# Patient Record
Sex: MALE | Race: WHITE | NOT HISPANIC OR LATINO | Employment: OTHER | URBAN - METROPOLITAN AREA
[De-identification: names, ages, dates, MRNs, and addresses within clinical notes are randomized per-mention and may not be internally consistent; named-entity substitution may affect disease eponyms.]

---

## 2022-04-23 ENCOUNTER — HOSPITAL ENCOUNTER (EMERGENCY)
Facility: HOSPITAL | Age: 77
Discharge: HOME | End: 2022-04-23
Attending: EMERGENCY MEDICINE
Payer: COMMERCIAL

## 2022-04-23 VITALS
OXYGEN SATURATION: 98 % | WEIGHT: 220 LBS | TEMPERATURE: 97.3 F | SYSTOLIC BLOOD PRESSURE: 135 MMHG | HEIGHT: 72 IN | RESPIRATION RATE: 17 BRPM | BODY MASS INDEX: 29.8 KG/M2 | DIASTOLIC BLOOD PRESSURE: 70 MMHG | HEART RATE: 78 BPM

## 2022-04-23 DIAGNOSIS — K40.90 INGUINAL HERNIA: Primary | ICD-10-CM

## 2022-04-23 PROCEDURE — 99283 EMERGENCY DEPT VISIT LOW MDM: CPT

## 2022-04-23 PROCEDURE — 99282 EMERGENCY DEPT VISIT SF MDM: CPT

## 2022-04-23 RX ORDER — ATORVASTATIN CALCIUM 10 MG/1
10 TABLET, FILM COATED ORAL EVERY EVENING
Status: ON HOLD | COMMUNITY

## 2022-04-23 NOTE — ED PROVIDER NOTES
History  Chief Complaint   Patient presents with    Hernia     Pt presents with brother who is concerned pt has bilateral hernias, denies GI symptoms, denies pain  Patient is a 78 YOM who presents to the ED for evaluation of bilateral inguinal hernia  Patient reports that he has had this for over a year, the hernia has recently doubled in size  Patient reports that the hernia causes no pain or discomfort and has not had any skin discoloration  Patient denies any NV, stool changes, urinary issues, testicular pain  He has no other complaints at this time  Prior to Admission Medications   Prescriptions Last Dose Informant Patient Reported? Taking?   atorvastatin (LIPITOR) 10 mg tablet More than a month at Unknown time  Yes No   Sig: Take 10 mg by mouth daily      Facility-Administered Medications: None       History reviewed  No pertinent past medical history  History reviewed  No pertinent surgical history  History reviewed  No pertinent family history  I have reviewed and agree with the history as documented  E-Cigarette/Vaping    E-Cigarette Use Never User      E-Cigarette/Vaping Substances    Nicotine No     THC No     CBD No     Flavoring No     Other No     Unknown No      Social History     Tobacco Use    Smoking status: Former Smoker    Smokeless tobacco: Never Used   Vaping Use    Vaping Use: Never used   Substance Use Topics    Alcohol use: Yes     Comment: rare    Drug use: Not Currently       Review of Systems   Constitutional: Negative for activity change, appetite change, chills and fever  Respiratory: Negative for chest tightness and shortness of breath  Gastrointestinal: Negative for abdominal distention, abdominal pain, blood in stool, diarrhea, nausea and vomiting  Genitourinary: Negative for difficulty urinating, hematuria, scrotal swelling and testicular pain  Musculoskeletal: Negative for arthralgias and back pain     Skin: Negative for color change, pallor and rash  Neurological: Negative for dizziness, facial asymmetry, light-headedness and headaches  All other systems reviewed and are negative  Physical Exam  Physical Exam  Vitals and nursing note reviewed  Exam conducted with a chaperone present Hector Lui)  Constitutional:       Appearance: Normal appearance  HENT:      Head: Normocephalic and atraumatic  Nose: Nose normal    Cardiovascular:      Rate and Rhythm: Normal rate and regular rhythm  Pulses: Normal pulses  Heart sounds: Normal heart sounds  No murmur heard  Pulmonary:      Effort: Pulmonary effort is normal  No respiratory distress  Breath sounds: Normal breath sounds  No stridor  No wheezing or rales  Genitourinary:     Penis: Normal  No tenderness  Testes: Normal          Right: Tenderness or swelling not present  Left: Tenderness or swelling not present  Epididymis:      Right: Normal       Left: Normal        Musculoskeletal:         General: Normal range of motion  Cervical back: Normal range of motion and neck supple  Skin:     General: Skin is warm and dry  Neurological:      Mental Status: He is alert and oriented to person, place, and time     Psychiatric:         Mood and Affect: Mood normal          Behavior: Behavior normal          Vital Signs  ED Triage Vitals [04/23/22 1431]   Temperature Pulse Respirations Blood Pressure SpO2   (!) 97 3 °F (36 3 °C) 81 16 152/63 98 %      Temp Source Heart Rate Source Patient Position - Orthostatic VS BP Location FiO2 (%)   Temporal Monitor Sitting Right arm --      Pain Score       No Pain           Vitals:    04/23/22 1431   BP: 152/63   Pulse: 81   Patient Position - Orthostatic VS: Sitting         Visual Acuity      ED Medications  Medications - No data to display    Diagnostic Studies  Results Reviewed     None                 No orders to display              Procedures  Procedures         ED Course SBIRT 20yo+      Most Recent Value   SBIRT (24 yo +)    In order to provide better care to our patients, we are screening all of our patients for alcohol and drug use  Would it be okay to ask you these screening questions? Yes Filed at: 04/23/2022 1441   Initial Alcohol Screen: US AUDIT-C     1  How often do you have a drink containing alcohol? 1 Filed at: 04/23/2022 1441   2  How many drinks containing alcohol do you have on a typical day you are drinking? 0 Filed at: 04/23/2022 1441   3a  Male UNDER 65: How often do you have five or more drinks on one occasion? 0 Filed at: 04/23/2022 1441   3b  FEMALE Any Age, or MALE 65+: How often do you have 4 or more drinks on one occassion? 0 Filed at: 04/23/2022 1441   Audit-C Score 1 Filed at: 04/23/2022 1441   WILFREDO: How many times in the past year have you    Used an illegal drug or used a prescription medication for non-medical reasons? Never Filed at: 04/23/2022 1441                    MDM  Number of Diagnoses or Management Options  Inguinal hernia: new and requires workup  Diagnosis management comments: Inguinal hernia that does not cause pain or discomfort  Reducible in ED  No NV, appetite changes, issues with bowel movements or urination  No testicular pain or swelling  Referral to general surgery for elective consultation  Extensive discussion about reasons to return for emergent intervention including but not limited to pain, discoloration, NV  Patient and brother in agreement with plan           Amount and/or Complexity of Data Reviewed  Discuss the patient with other providers: yes    Patient Progress  Patient progress: stable      Disposition  Final diagnoses:   Inguinal hernia     Time reflects when diagnosis was documented in both MDM as applicable and the Disposition within this note     Time User Action Codes Description Comment    4/23/2022  3:29 PM Vinetta Schaumann Add [K40 90] Inguinal hernia       ED Disposition     ED Disposition Condition Date/Time Comment    Discharge Stable Sat Apr 23, 2022  3:28 PM Theodore Ponce discharge to home/self care  Follow-up Information     Follow up With Specialties Details Why Contact Info Additional Information    [facility] General Surgery [facility] Surgery Schedule an appointment as soon as possible for a visit   [address] [place] [phone]  [place], [address] [facility], [address] [place] [address], [address] [facility]          Patient's Medications   Discharge Prescriptions    No medications on file       No discharge procedures on file      PDMP Review     None          ED Provider  Electronically Signed by           Celestino Bay PA-C  04/23/22 6872

## 2022-04-23 NOTE — DISCHARGE INSTRUCTIONS
Follow-up with general surgery for consultation  Return to ED if pain discoloration, nausea, vomiting develop

## 2022-05-09 ENCOUNTER — APPOINTMENT (OUTPATIENT)
Dept: LAB | Facility: HOSPITAL | Age: 77
End: 2022-05-09
Attending: SURGERY
Payer: COMMERCIAL

## 2022-05-09 ENCOUNTER — HOSPITAL ENCOUNTER (OUTPATIENT)
Dept: RADIOLOGY | Facility: HOSPITAL | Age: 77
Discharge: HOME | End: 2022-05-09
Attending: SURGERY
Payer: COMMERCIAL

## 2022-05-09 ENCOUNTER — CONSULT (OUTPATIENT)
Dept: SURGERY | Facility: HOSPITAL | Age: 77
End: 2022-05-09
Payer: COMMERCIAL

## 2022-05-09 VITALS
RESPIRATION RATE: 16 BRPM | BODY MASS INDEX: 26.28 KG/M2 | HEIGHT: 72 IN | HEART RATE: 68 BPM | DIASTOLIC BLOOD PRESSURE: 87 MMHG | SYSTOLIC BLOOD PRESSURE: 132 MMHG | WEIGHT: 194 LBS | TEMPERATURE: 97.8 F

## 2022-05-09 DIAGNOSIS — K40.90 NON-RECURRENT UNILATERAL INGUINAL HERNIA WITHOUT OBSTRUCTION OR GANGRENE: ICD-10-CM

## 2022-05-09 DIAGNOSIS — Z12.12 ENCOUNTER FOR COLORECTAL CANCER SCREENING: ICD-10-CM

## 2022-05-09 DIAGNOSIS — K40.90 NON-RECURRENT UNILATERAL INGUINAL HERNIA WITHOUT OBSTRUCTION OR GANGRENE: Primary | ICD-10-CM

## 2022-05-09 DIAGNOSIS — Z12.11 ENCOUNTER FOR COLORECTAL CANCER SCREENING: ICD-10-CM

## 2022-05-09 LAB
EST. AVERAGE GLUCOSE BLD GHB EST-MCNC: 114 MG/DL
HBA1C MFR BLD: 5.6 %

## 2022-05-09 PROCEDURE — 93005 ELECTROCARDIOGRAM TRACING: CPT

## 2022-05-09 PROCEDURE — 71046 X-RAY EXAM CHEST 2 VIEWS: CPT

## 2022-05-09 PROCEDURE — 83036 HEMOGLOBIN GLYCOSYLATED A1C: CPT

## 2022-05-09 PROCEDURE — 99203 OFFICE O/P NEW LOW 30 MIN: CPT | Performed by: SURGERY

## 2022-05-09 PROCEDURE — 36415 COLL VENOUS BLD VENIPUNCTURE: CPT

## 2022-05-10 LAB
ATRIAL RATE: 68 BPM
P AXIS: 8 DEGREES
PR INTERVAL: 144 MS
QRS AXIS: 29 DEGREES
QRSD INTERVAL: 78 MS
QT INTERVAL: 390 MS
QTC INTERVAL: 414 MS
T WAVE AXIS: 21 DEGREES
VENTRICULAR RATE: 68 BPM

## 2022-05-10 PROCEDURE — 93010 ELECTROCARDIOGRAM REPORT: CPT | Performed by: INTERNAL MEDICINE

## 2022-05-13 RX ORDER — SODIUM CHLORIDE, SODIUM LACTATE, POTASSIUM CHLORIDE, CALCIUM CHLORIDE 600; 310; 30; 20 MG/100ML; MG/100ML; MG/100ML; MG/100ML
125 INJECTION, SOLUTION INTRAVENOUS CONTINUOUS
Status: CANCELLED | OUTPATIENT
Start: 2022-05-25

## 2022-05-13 RX ORDER — CEFAZOLIN SODIUM 2 G/50ML
2000 SOLUTION INTRAVENOUS ONCE
Status: CANCELLED | OUTPATIENT
Start: 2022-05-25 | End: 2022-05-24

## 2022-05-13 NOTE — PROGRESS NOTES
Assessment/Plan:   Jana Lawrence is a 78 y o male who is here for Inguinal Hernia (New patient referred by his PCP for evaluation of BIH that patient has had for approximately over one year  Only has discomfort when hernias "swell up"  No n/v/f/c  No change in bladder or bowels  No previous hernia surgery )    Plan: right inguinal hernia - discussed operative vs conservative mgt, surgical approaches, risks and benefits and patient has decided to proceed with open right inguinal hernia repair  I will schedule at their earliest convenience  Preoperative Clearance: None    HPI:  Jana Lawrence is a 78 y o male who was referred for evaluation of Inguinal Hernia (New patient referred by his PCP for evaluation of BIH that patient has had for approximately over one year  Only has discomfort when hernias "swell up"  No n/v/f/c  No change in bladder or bowels  No previous hernia surgery )    Currently having right groin pain  ROS:  General ROS: negative  negative for - chills, fatigue, fever or night sweats, weight loss  Respiratory ROS: no cough, shortness of breath, or wheezing  Cardiovascular ROS: no chest pain or dyspnea on exertion  Genito-Urinary ROS: no dysuria, trouble voiding, or hematuria  Musculoskeletal ROS: negative for - gait disturbance, joint pain or muscle pain  Neurological ROS: no TIA or stroke symptoms  Right groin pain    [unfilled]  Patient has no known allergies  Current Outpatient Medications:     atorvastatin (LIPITOR) 10 mg tablet, Take 10 mg by mouth daily, Disp: , Rfl:   Past Medical History:   Diagnosis Date    Bilateral inguinal hernia 04/23/2022    Evaluated @ [facility]    Hyperlipidemia      Past Surgical History:   Procedure Laterality Date    CATARACT EXTRACTION      COLONOSCOPY W/ BIOPSIES  11/12/2001    Danni Vila MD     History reviewed  No pertinent family history  reports that he has quit smoking   He has never used smokeless tobacco  He reports current alcohol use  He reports previous drug use  Labs:   No results found for: WBC, HGB, PLT  No results found for: ALT, AST  This SmartLink has not been configured with any valid records  PHYSICAL EXAM  General Appearance:    Alert, cooperative, no distress,    Head:    Normocephalic without obvious abnormality   Eyes:    PERRL, conjunctiva/corneas clear, EOM's intact        Neck:   Supple, no adenopathy, no JVD   Back:     Symmetric, no spinal or CVA tenderness   Lungs:     Clear to auscultation bilaterally, no wheezing or rhonchi   Heart:    Regular rate and rhythm, S1 and S2 normal, no murmur   Abdomen:     Soft +BS ND TTP in right groin, RIH   Extremities:   Extremities normal  No clubbing, cyanosis or edema   Psych:   Normal Affect, AOx3  Neurologic:  Skin:   CNII-XII intact  Strength symmetric, speech intact    Warm, dry, intact, no visible rashes or lesions         Physical Exam              Some portions of this record may have been generated with voice recognition software  There may be translation, syntax,  or grammatical errors  Occasional wrong word or "sound-a-like" substitutions may have occurred due to the inherent limitations of the voice recognition software  Read the chart carefully and recognize, using context, where substitutions may have occurred  If you have any questions, please contact the dictating provider for clarification or correction, as needed  This encounter has been coded by a non-certified coder

## 2022-05-14 ENCOUNTER — APPOINTMENT (EMERGENCY)
Dept: CT IMAGING | Facility: HOSPITAL | Age: 77
End: 2022-05-14
Payer: COMMERCIAL

## 2022-05-14 ENCOUNTER — HOSPITAL ENCOUNTER (EMERGENCY)
Facility: HOSPITAL | Age: 77
Discharge: HOME | End: 2022-05-14
Attending: EMERGENCY MEDICINE
Payer: COMMERCIAL

## 2022-05-14 VITALS
OXYGEN SATURATION: 99 % | TEMPERATURE: 97.8 F | SYSTOLIC BLOOD PRESSURE: 134 MMHG | DIASTOLIC BLOOD PRESSURE: 83 MMHG | RESPIRATION RATE: 16 BRPM | HEART RATE: 59 BPM

## 2022-05-14 DIAGNOSIS — K59.00 CONSTIPATION, UNSPECIFIED CONSTIPATION TYPE: Primary | ICD-10-CM

## 2022-05-14 PROCEDURE — 74176 CT ABD & PELVIS W/O CONTRAST: CPT

## 2022-05-14 PROCEDURE — 99284 EMERGENCY DEPT VISIT MOD MDM: CPT

## 2022-05-14 PROCEDURE — 99284 EMERGENCY DEPT VISIT MOD MDM: CPT | Performed by: EMERGENCY MEDICINE

## 2022-05-14 PROCEDURE — G1004 CDSM NDSC: HCPCS

## 2022-05-14 RX ADMIN — IOHEXOL 50 ML: 240 INJECTION, SOLUTION INTRATHECAL; INTRAVASCULAR; INTRAVENOUS; ORAL at 09:45

## 2022-05-14 NOTE — ED PROCEDURE NOTE
PROCEDURE  Procedures   No procedure performed  Disregard this procedure note       Jose Parsons DO  05/16/22 0684

## 2022-05-14 NOTE — DISCHARGE INSTRUCTIONS
Take MiraLax twice a day until you have several days of bowel movements  Then take it once a day for another week  Follow-up with the surgeon if any difficulties arise

## 2022-05-14 NOTE — ED PROVIDER NOTES
History  Chief Complaint   Patient presents with    Abdominal Pain     Abdominal stiffness and no bowel movement for 9 days  26-year-old male with history hyperlipidemia and bilateral inguinal hernia presents with his brother with concern for no bowel movement over last 9 days  He says he usually has a bowel movement every day or 2  Patient denies change in appetite, abdominal pain, vomiting and dysuria  He does note slight increase in size of the inguinal hernias since April 23  The was evaluated by the surgeon and is scheduled for herniorrhaphy in 11 days  Patient has no other complaints  His brother, who states he has a doctor, endorses these complaints  He is concerned for a partial bowel obstruction  Prior to Admission Medications   Prescriptions Last Dose Informant Patient Reported? Taking?   atorvastatin (LIPITOR) 10 mg tablet   Yes No   Sig: Take 10 mg by mouth daily      Facility-Administered Medications: None       Past Medical History:   Diagnosis Date    Bilateral inguinal hernia 04/23/2022    Evaluated @ [facility]    Hyperlipidemia        Past Surgical History:   Procedure Laterality Date    CATARACT EXTRACTION      COLONOSCOPY W/ BIOPSIES  11/12/2001    Ritu Gao MD       History reviewed  No pertinent family history  I have reviewed and agree with the history as documented  E-Cigarette/Vaping    E-Cigarette Use Never User      E-Cigarette/Vaping Substances    Nicotine No     THC No     CBD No     Flavoring No     Other No     Unknown No      Social History     Tobacco Use    Smoking status: Former Smoker    Smokeless tobacco: Never Used   Vaping Use    Vaping Use: Never used   Substance Use Topics    Alcohol use: Yes     Comment: rare    Drug use: Not Currently       Review of Systems   Gastrointestinal: Positive for constipation  Negative for abdominal pain, blood in stool, diarrhea, nausea and vomiting  Genitourinary: Positive for scrotal swelling  Negative for decreased urine volume, dysuria, flank pain, frequency and hematuria  All other systems reviewed and are negative  Physical Exam  Physical Exam  Vitals and nursing note reviewed  Constitutional:       General: He is not in acute distress  Appearance: He is well-developed and normal weight  He is not ill-appearing or diaphoretic  HENT:      Head: Normocephalic and atraumatic  Right Ear: External ear normal       Left Ear: External ear normal       Mouth/Throat:      Mouth: Mucous membranes are moist       Pharynx: Oropharynx is clear  Eyes:      General: No scleral icterus  Conjunctiva/sclera: Conjunctivae normal       Pupils: Pupils are equal, round, and reactive to light  Neck:      Vascular: No JVD  Cardiovascular:      Rate and Rhythm: Normal rate and regular rhythm  Heart sounds: Normal heart sounds  No murmur heard  Pulmonary:      Effort: Pulmonary effort is normal       Breath sounds: Normal breath sounds  Abdominal:      General: Abdomen is flat  Bowel sounds are normal  There is no distension or abdominal bruit  There are no signs of injury  Palpations: Abdomen is soft  There is no fluid wave or mass  Tenderness: There is no abdominal tenderness  There is no guarding or rebound  Hernia: A hernia is present  Hernia is present in the left inguinal area and right inguinal area  Musculoskeletal:         General: No tenderness  Normal range of motion  Cervical back: Normal range of motion and neck supple  Lymphadenopathy:      Cervical: No cervical adenopathy  Skin:     General: Skin is warm and dry  Capillary Refill: Capillary refill takes less than 2 seconds  Findings: No rash  Neurological:      General: No focal deficit present  Mental Status: He is alert and oriented to person, place, and time  Cranial Nerves: No cranial nerve deficit        Coordination: Coordination normal       Deep Tendon Reflexes: Reflexes are normal and symmetric  Psychiatric:         Mood and Affect: Mood normal          Behavior: Behavior normal          Vital Signs  ED Triage Vitals [05/14/22 0904]   Temperature Pulse Respirations Blood Pressure SpO2   97 8 °F (36 6 °C) 71 16 167/79 99 %      Temp Source Heart Rate Source Patient Position - Orthostatic VS BP Location FiO2 (%)   Temporal Monitor Lying Right arm --      Pain Score       No Pain           Vitals:    05/14/22 1000 05/14/22 1030 05/14/22 1100 05/14/22 1130   BP: 130/86 117/79 141/67 134/83   Pulse: 57 55 55 59   Patient Position - Orthostatic VS: Lying Lying Lying Lying         Visual Acuity      ED Medications  Medications   iohexol (OMNIPAQUE) 240 MG/ML solution 50 mL (50 mL Oral Given 5/14/22 0945)       Diagnostic Studies  Results Reviewed     None                 CT abdomen pelvis wo contrast   Final Result by Debbie Mayorga MD (05/14 1218)      No acute inflammatory changes in the abdomen or pelvis  Large bilateral bowel-containing hernias not currently causing obstruction  Prominent stool throughout the colon, both proximal and distal to the sigmoid colon containing left inguinal hernia, suggestive of constipation  Workstation performed: [id number]                    Procedures  Procedures         ED Course                                             MDM  Number of Diagnoses or Management Options  Constipation, unspecified constipation type: new and requires workup  Diagnosis management comments: Elderly male with large bilateral inguinal hernias has not had bowel movement for 9 days  Denies vomiting, fever and pain  She has you poor historian  Here with his brother helps to give history  Patient had recent lab work  He had remission testing for in herniorrhaphy  Will check CT of abdomen pelvis with oral contrast to rule out obstruction, mass, inflammation, incarceration, etcetera  CT shows no obstruction    There are bilateral inguinal hernias without incarceration  Patient stable for discharge  Constipation instructions given  Amount and/or Complexity of Data Reviewed  Tests in the radiology section of CPT®: ordered and reviewed  Review and summarize past medical records: yes        Disposition  Final diagnoses:   Constipation, unspecified constipation type     Time reflects when diagnosis was documented in both MDM as applicable and the Disposition within this note     Time User Action Codes Description Comment    5/14/2022 12:26 PM Jeana Brownlee Add [K59 00] Constipation, unspecified constipation type       ED Disposition     ED Disposition   Discharge    Condition   Stable    Date/Time   Sat May 14, 2022 12:26 PM    Comment   Gareth Aburto discharge to home/self care  Follow-up Information     Follow up With Specialties Details Why Contact Info    Sd Macias MD General Surgery Call  As needed [address] [facility]  [phone]            Patient's Medications   Discharge Prescriptions    No medications on file       No discharge procedures on file      PDMP Review     None          ED Provider  Electronically Signed by           Sandip Cortez DO  05/14/22 0668

## 2022-05-23 DIAGNOSIS — Z12.11 SCREENING FOR COLON CANCER: Primary | ICD-10-CM

## 2022-06-01 LAB — COLOGUARD RESULT REPORTABLE: POSITIVE

## 2022-06-02 NOTE — H&P (VIEW-ONLY)
Colon and Rectal Surgery   Virgen Calderon 78 y o  male MRN [id number]  Encounter: [id number]  06/06/22 3:54 PM            Assessment: Virgen Calderon is a 78 y o  male who has a positive Cologuard  Plan:   Positive colorectal cancer screening using Cologuard test  Colonoscopy risks, not limited to bleeding, perforated colon, need for surgery, and missed lesions were discussed  Alternatives were discussed  Questions were answered  He agreed to the procedure  He has large inguinal hernias  We will try to reduce them during his colonoscopy preparation  Hopefully, we can accomplish colonoscopy without any significant untoward events  He should then proceed to hernia repair after the colonoscopy  I asked him to reschedule to follow the colonoscopy as soon as possible  If we find a colon lesion, we could change that plan  Subjective     HPI    Virgen Calderon is a 78 y o  male who is here today for evaluation of positive cologuard  He states that he has bowel movements every 1-2 days  He was taking miralax daily but has cut back recently  When he is not take the miralax he has hard stools and has to strain to have bowel movements  He denies any rectal bleeding or blood in his stool  He has two inguinal hernias that cause him pain and discomfort  He has seen a general surgeon and was schedule to have hernia surgery however he recently had a positive cologuard  His surgery was canceled until he has a colonoscopy  I have schedule him for colonoscopy on 6/15/2022 with Dr Donya Peguero  He had a negative cologuard in 2019  Recently he had a positive cologuard on 5/25/2022  His most recent colonoscopy was 1/24/2011 with Dr Marylen Dresser, which showed mild diverticulosis sigmoid colon  Pathology reported colonic mucosa with focal early adenomatous changes, hyperplastic polyp  Recall colonoscopy 3 years            Historical Information   Past Medical History:   Diagnosis Date    Bilateral inguinal hernia 04/23/2022    Evaluated @ [facility]    Hyperlipidemia      Past Surgical History:   Procedure Laterality Date    CATARACT EXTRACTION      COLONOSCOPY      COLONOSCOPY W/ BIOPSIES  11/12/2001    Rober Chan MD       Meds/Allergies       Current Outpatient Medications:     atorvastatin (LIPITOR) 10 mg tablet, Take 10 mg by mouth every evening, Disp: , Rfl:   No Known Allergies    Social History   Social History     Substance and Sexual Activity   Drug Use Not Currently     Social History     Tobacco Use   Smoking Status Former Smoker   Smokeless Tobacco Never Used         History reviewed  No pertinent family history  Review of Systems    Objective   Current Vitals:  Vitals:    06/06/22 1511   Weight: 88 5 kg (195 lb)   Height: 6' (1 829 m)         Physical Exam  Constitutional:       Appearance: Normal appearance  Cardiovascular:      Rate and Rhythm: Normal rate and regular rhythm  Pulmonary:      Effort: Pulmonary effort is normal       Breath sounds: Normal breath sounds  Abdominal:      General: Abdomen is flat  Palpations: Abdomen is soft  There is no mass  Tenderness: There is no abdominal tenderness  There is no guarding  Hernia: A hernia is present  Comments: Large inguinal hernias reduced, completely or nearly completely  Neurological:      General: No focal deficit present  Mental Status: He is alert and oriented to person, place, and time     Psychiatric:         Mood and Affect: Mood normal          Behavior: Behavior normal

## 2022-06-06 PROBLEM — R19.5 POSITIVE COLORECTAL CANCER SCREENING USING COLOGUARD TEST: Status: ACTIVE | Noted: 2022-06-06

## 2022-06-15 ENCOUNTER — ANESTHESIA (OUTPATIENT)
Dept: GASTROENTEROLOGY | Facility: HOSPITAL | Age: 77
End: 2022-06-15

## 2022-06-15 ENCOUNTER — ANESTHESIA EVENT (OUTPATIENT)
Dept: GASTROENTEROLOGY | Facility: HOSPITAL | Age: 77
End: 2022-06-15

## 2022-06-15 ENCOUNTER — HOSPITAL ENCOUNTER (OUTPATIENT)
Dept: GASTROENTEROLOGY | Facility: HOSPITAL | Age: 77
Setting detail: OUTPATIENT SURGERY
Discharge: HOME | End: 2022-06-15
Attending: COLON & RECTAL SURGERY
Payer: COMMERCIAL

## 2022-06-15 VITALS
SYSTOLIC BLOOD PRESSURE: 127 MMHG | TEMPERATURE: 97.7 F | HEART RATE: 76 BPM | OXYGEN SATURATION: 97 % | RESPIRATION RATE: 18 BRPM | DIASTOLIC BLOOD PRESSURE: 79 MMHG

## 2022-06-15 DIAGNOSIS — Z86.010 PERSONAL HISTORY OF COLONIC POLYPS: ICD-10-CM

## 2022-06-15 DIAGNOSIS — R19.5 POSITIVE COLORECTAL CANCER SCREENING USING COLOGUARD TEST: ICD-10-CM

## 2022-06-15 PROBLEM — E78.5 HYPERLIPIDEMIA: Chronic | Status: ACTIVE | Noted: 2022-06-15

## 2022-06-15 PROCEDURE — 88305 TISSUE EXAM BY PATHOLOGIST: CPT | Performed by: PATHOLOGY

## 2022-06-15 PROCEDURE — 45385 COLONOSCOPY W/LESION REMOVAL: CPT | Performed by: COLON & RECTAL SURGERY

## 2022-06-15 RX ORDER — PROPOFOL 10 MG/ML
INJECTION, EMULSION INTRAVENOUS AS NEEDED
Status: DISCONTINUED | OUTPATIENT
Start: 2022-06-15 | End: 2022-06-15

## 2022-06-15 RX ORDER — SODIUM CHLORIDE 9 MG/ML
INJECTION, SOLUTION INTRAVENOUS CONTINUOUS PRN
Status: DISCONTINUED | OUTPATIENT
Start: 2022-06-15 | End: 2022-06-15

## 2022-06-15 RX ADMIN — PROPOFOL 30 MG: 10 INJECTION, EMULSION INTRAVENOUS at 12:39

## 2022-06-15 RX ADMIN — SODIUM CHLORIDE: 9 INJECTION, SOLUTION INTRAVENOUS at 12:24

## 2022-06-15 RX ADMIN — PROPOFOL 30 MG: 10 INJECTION, EMULSION INTRAVENOUS at 12:34

## 2022-06-15 RX ADMIN — PROPOFOL 100 MG: 10 INJECTION, EMULSION INTRAVENOUS at 12:30

## 2022-06-15 RX ADMIN — PROPOFOL 50 MG: 10 INJECTION, EMULSION INTRAVENOUS at 12:45

## 2022-06-15 RX ADMIN — PROPOFOL 50 MG: 10 INJECTION, EMULSION INTRAVENOUS at 12:51

## 2022-06-15 NOTE — ANESTHESIA POSTPROCEDURE EVALUATION
Post-Op Assessment Note    CV Status:  Stable  Pain Score: 0    Pain management: adequate     Mental Status:  Alert and awake   Hydration Status:  Stable   PONV Controlled:  None   Airway Patency:  Patent      Post Op Vitals Reviewed: Yes      Staff: CRNA         No complications documented      /69 (06/15/22 1259)    Temp 97 7 °F (36 5 °C) (06/15/22 1259)    Pulse 73 (06/15/22 1259)   Resp 16 (06/15/22 1259)    SpO2 96 % (06/15/22 1259)

## 2022-06-15 NOTE — ANESTHESIA PREPROCEDURE EVALUATION
Procedure:  COLONOSCOPY    Relevant Problems   ANESTHESIA (within normal limits)      CARDIO   (+) Hyperlipidemia      ENDO (within normal limits)      GI/HEPATIC (within normal limits)      /RENAL (within normal limits)      GYN (within normal limits)      HEMATOLOGY (within normal limits)      MUSCULOSKELETAL (within normal limits)      NEURO/PSYCH (within normal limits)  Possible autism spectrum, has personal nurse advocate with him at bedside      PULMONARY (within normal limits)      Recent labs personally reviewed:  No results found for: WBC, HGB, PLT  No results found for: NA, K, BUN, CREATININE, GLUCOSE  No results found for: PTT   No results found for: INR      Lab Results   Component Value Date    HGBA1C 5 6 05/09/2022       Physical Exam    Airway      TM Distance: >3 FB  Neck ROM: full     Dental       Cardiovascular  Cardiovascular exam normal    Pulmonary  Pulmonary exam normal     Other Findings  Poison Ivy      Anesthesia Plan  ASA Score- 2     Anesthesia Type- IV sedation with anesthesia with ASA Monitors  Additional Monitors:   Airway Plan:     Comment: Supplemental o2, etco2 monitoring  Plan Factors-Exercise tolerance (METS): >4 METS  Chart reviewed  Patient summary reviewed  Patient is not a current smoker  Patient not instructed to abstain from smoking on day of procedure  Patient did not smoke on day of surgery  Induction- intravenous  Postoperative Plan-     Informed Consent- Anesthetic plan and risks discussed with patient  I personally reviewed this patient with the CRNA  Discussed and agreed on the Anesthesia Plan with the CRNA  José Manuel Wren

## 2022-06-15 NOTE — INTERVAL H&P NOTE
H&P reviewed  After examining the patient I find no changes in the patients condition since the H&P had been written  There were no vitals filed for this visit  Obtained in colonoscopy suite

## 2022-06-21 ENCOUNTER — TELEPHONE (OUTPATIENT)
Dept: OTHER | Facility: OTHER | Age: 77
End: 2022-06-21

## 2022-06-21 NOTE — TELEPHONE ENCOUNTER
PT 's care taker Laura Ramone stated she was told there was an appointment with Dr Selena Padilla on 07/06 @2:00, for a second opinion on his hernia  I do not see that in the PT's upcoming appointments, please call Angela vargas

## 2022-07-06 ENCOUNTER — CONSULT (OUTPATIENT)
Dept: SURGERY | Facility: CLINIC | Age: 77
End: 2022-07-06
Payer: COMMERCIAL

## 2022-07-06 ENCOUNTER — TELEPHONE (OUTPATIENT)
Dept: OTHER | Facility: OTHER | Age: 77
End: 2022-07-06

## 2022-07-06 VITALS
HEART RATE: 95 BPM | SYSTOLIC BLOOD PRESSURE: 136 MMHG | OXYGEN SATURATION: 98 % | WEIGHT: 187 LBS | RESPIRATION RATE: 12 BRPM | BODY MASS INDEX: 25.33 KG/M2 | HEIGHT: 72 IN | DIASTOLIC BLOOD PRESSURE: 76 MMHG | TEMPERATURE: 98 F

## 2022-07-06 DIAGNOSIS — K40.20 NON-RECURRENT BILATERAL INGUINAL HERNIA WITHOUT OBSTRUCTION OR GANGRENE: Primary | ICD-10-CM

## 2022-07-06 PROCEDURE — 99214 OFFICE O/P EST MOD 30 MIN: CPT | Performed by: SURGERY

## 2022-07-06 NOTE — PROGRESS NOTES
Office Visit - General Surgery  Adis Bennett MRN: [id number]  Encounter: [id number]    Assessment and Plan  Bilateral symptomatic inguinal hernias -- would proceed with open inguinal herniorraphy after cardiac evaluation/clearance  Problem List Items Addressed This Visit    None         Chief Complaint:  Adis Bennett is a 78 y o  male who presents with symptomatic inguinal hernias  Subjective  Pain R>L especially when active    Past Medical History:   Diagnosis Date    Bilateral inguinal hernia 04/23/2022    Evaluated @ [facility]    Hyperlipidemia        Past Surgical History:   Procedure Laterality Date    CATARACT EXTRACTION      COLONOSCOPY      COLONOSCOPY W/ BIOPSIES  11/12/2001    Jazmyn Leyva MD       History reviewed  No pertinent family history  Social History     Tobacco Use    Smoking status: Former Smoker    Smokeless tobacco: Never Used   Vaping Use    Vaping Use: Never used   Substance Use Topics    Alcohol use: Yes     Comment: rare    Drug use: Not Currently        Medications  Current Outpatient Medications on File Prior to Visit   Medication Sig Dispense Refill    atorvastatin (LIPITOR) 10 mg tablet Take 10 mg by mouth every evening       No current facility-administered medications on file prior to visit         Allergies  No Known Allergies    Review of Systems    Objective  Vitals:    07/06/22 1350   BP: 136/76   Pulse: 95   Resp: 12   Temp: 98 °F (36 7 °C)   SpO2: 98%       Physical Exam     Heart -- RRR s murmurs  Lungs -- CTA bilaterally    Abdomen -- reducible bilateral inguinal hernias

## 2022-07-06 NOTE — TELEPHONE ENCOUNTER
Patient's RN nurse advocate called to make an appointment for patient to have pre-op clearance; wants to be scheduled with Dr Armando Herrera if possible

## 2022-07-07 ENCOUNTER — TELEPHONE (OUTPATIENT)
Dept: CARDIOLOGY CLINIC | Facility: CLINIC | Age: 77
End: 2022-07-07

## 2022-07-07 NOTE — TELEPHONE ENCOUNTER
This is a new patient and we have no new patient appointments in [place] next week or any other week until the end of August, or when the September schedule comes out   This patient was scheduled in [facility] on 8/11

## 2022-07-07 NOTE — TELEPHONE ENCOUNTER
Pt was offered an appt in [facility] office  Pt did not have transportation for that day  (Fridays are not convenient for pt)    No available appts in [facility] or [facility]  Spoke with pt's caregiver, Sal Alcatnar, and they are willing to travel to other locations  Please schedule pt in [place], as you have openings next week  Thank you

## 2022-07-12 ENCOUNTER — OFFICE VISIT (OUTPATIENT)
Dept: CARDIOLOGY CLINIC | Facility: CLINIC | Age: 77
End: 2022-07-12
Payer: COMMERCIAL

## 2022-07-12 VITALS
SYSTOLIC BLOOD PRESSURE: 128 MMHG | WEIGHT: 184.2 LBS | DIASTOLIC BLOOD PRESSURE: 68 MMHG | BODY MASS INDEX: 24.95 KG/M2 | HEART RATE: 68 BPM | HEIGHT: 72 IN

## 2022-07-12 DIAGNOSIS — Z01.810 PREOP CARDIOVASCULAR EXAM: Primary | ICD-10-CM

## 2022-07-12 PROCEDURE — 99203 OFFICE O/P NEW LOW 30 MIN: CPT | Performed by: INTERNAL MEDICINE

## 2022-07-12 NOTE — PROGRESS NOTES
Consultation - Cardiology   aYnique Barbosa 78 y o  male MRN: [id number]    Encounter: [id number]  1  Preop cardiovascular exam         Assessment/Plan     Assessment:   Preoperative Cardiac Clearance    Plan:    He has no prior history of CAD  His resting EKG is normal and he has no CV symptoms  He is low CV risk for surgery  History of Present Illness   Physician Requesting Consult: No att  providers found  Reason for Consult / Principal Problem: Preoperative Cardiac Clearance  HPI: Yanique Barbosa is a 78y o  year old male who presents for cardiac clearance for an inguinal hernia repair  He has no prior history of cad, chf or arrhythmia  He takes atorvastatin for hyperlipidemia  He has no chest pain, dyspnea or palpitations  He does occasionally go on walks and has no significant limitations  He is a former smoker and quit in 2000  Review of Systems   Constitutional: Negative  HENT: Negative  Eyes: Negative  Cardiovascular: Negative  Respiratory: Negative  Endocrine: Negative  Hematologic/Lymphatic: Negative  Skin: Negative  Musculoskeletal: Negative  Gastrointestinal: Negative  Genitourinary: Negative  Neurological: Negative  Psychiatric/Behavioral: Negative  Allergic/Immunologic: Negative  Historical Information   Past Medical History:   Diagnosis Date    Bilateral inguinal hernia 04/23/2022    Evaluated @ [facility]    Hyperlipidemia      Past Surgical History:   Procedure Laterality Date    CATARACT EXTRACTION      COLONOSCOPY      COLONOSCOPY W/ BIOPSIES  11/12/2001    Kashmir Torres MD       Social History:  Social History     Substance and Sexual Activity   Alcohol Use Yes    Comment: rare     Social History     Substance and Sexual Activity   Drug Use Not Currently     Social History     Tobacco Use   Smoking Status Former Smoker   Smokeless Tobacco Never Used       Family History:   No family history on file      Meds/Allergies   No Known Allergies    Current Outpatient Medications:     atorvastatin (LIPITOR) 10 mg tablet, Take 10 mg by mouth every evening, Disp: , Rfl:     Vitals:   Pulse: 68  Blood Pressue: 128/68  Weight: 83 6 kg (184 lb 3 2 oz)      Physical Exam  Constitutional:       General: He is not in acute distress  Appearance: He is well-developed  He is not diaphoretic  HENT:      Head: Normocephalic  Eyes:      General: No scleral icterus  Right eye: No discharge  Conjunctiva/sclera: Conjunctivae normal    Neck:      Vascular: No JVD  Cardiovascular:      Rate and Rhythm: Normal rate and regular rhythm  Heart sounds: No murmur heard  No friction rub  No gallop  Pulmonary:      Effort: Pulmonary effort is normal  No respiratory distress  Breath sounds: Normal breath sounds  No wheezing or rales  Abdominal:      General: Bowel sounds are normal  There is no distension  Palpations: Abdomen is soft  Tenderness: There is no abdominal tenderness  There is no rebound  Musculoskeletal:         General: No tenderness or deformity  Cervical back: Normal range of motion  Skin:     General: Skin is warm and dry  Neurological:      Mental Status: He is alert and oriented to person, place, and time  [unfilled]    Invasive Devices  Report    None                 No results found for: NA, CL, CO2, ANIONGAP, BUN, CREATININE, EGFR, GLUCOSE, CALCIUM, AST, ALT, ALKPHOS, PROT, BILITOT  No results found for: WBC, HGB, PLT  No components found for: TROP    Imaging:     EKG: NSR Normal ECG     Counseling / Coordination of Care  Total floor / unit time spent today 45 minutes  Greater than 50% of total time was spent with the patient and / or family counseling and / or coordination of care  A description of the counseling / coordination of care

## 2022-07-15 LAB
ALBUMIN SERPL-MCNC: 4.2 G/DL (ref 3.6–5.1)
ALBUMIN/GLOB SERPL: 2 (CALC) (ref 1–2.5)
ALP SERPL-CCNC: 77 U/L (ref 35–144)
ALT SERPL-CCNC: 17 U/L (ref 9–46)
AST SERPL-CCNC: 19 U/L (ref 10–35)
BASOPHILS # BLD AUTO: 38 CELLS/UL (ref 0–200)
BASOPHILS NFR BLD AUTO: 0.4 %
BILIRUB SERPL-MCNC: 0.7 MG/DL (ref 0.2–1.2)
BUN SERPL-MCNC: 28 MG/DL (ref 7–25)
BUN/CREAT SERPL: 18 (CALC) (ref 6–22)
CALCIUM SERPL-MCNC: 9 MG/DL (ref 8.6–10.3)
CHLORIDE SERPL-SCNC: 107 MMOL/L (ref 98–110)
CO2 SERPL-SCNC: 28 MMOL/L (ref 20–32)
CREAT SERPL-MCNC: 1.57 MG/DL (ref 0.7–1.28)
EOSINOPHIL # BLD AUTO: 105 CELLS/UL (ref 15–500)
EOSINOPHIL NFR BLD AUTO: 1.1 %
ERYTHROCYTE [DISTWIDTH] IN BLOOD BY AUTOMATED COUNT: 12.3 % (ref 11–15)
GFR/BSA.PRED SERPLBLD CYS-BASED-ARV: 46 ML/MIN/1.73M2
GLOBULIN SER CALC-MCNC: 2.1 G/DL (CALC) (ref 1.9–3.7)
GLUCOSE SERPL-MCNC: 101 MG/DL (ref 65–99)
HCT VFR BLD AUTO: 38.3 % (ref 38.5–50)
HGB BLD-MCNC: 13 G/DL (ref 13.2–17.1)
LYMPHOCYTES # BLD AUTO: 950 CELLS/UL (ref 850–3900)
LYMPHOCYTES NFR BLD AUTO: 10 %
MCH RBC QN AUTO: 32.6 PG (ref 27–33)
MCHC RBC AUTO-ENTMCNC: 33.9 G/DL (ref 32–36)
MCV RBC AUTO: 96 FL (ref 80–100)
MONOCYTES # BLD AUTO: 855 CELLS/UL (ref 200–950)
MONOCYTES NFR BLD AUTO: 9 %
NEUTROPHILS # BLD AUTO: 7553 CELLS/UL (ref 1500–7800)
NEUTROPHILS NFR BLD AUTO: 79.5 %
PLATELET # BLD AUTO: 173 THOUSAND/UL (ref 140–400)
PMV BLD REES-ECKER: 10.2 FL (ref 7.5–12.5)
POTASSIUM SERPL-SCNC: 4.4 MMOL/L (ref 3.5–5.3)
PROT SERPL-MCNC: 6.3 G/DL (ref 6.1–8.1)
RBC # BLD AUTO: 3.99 MILLION/UL (ref 4.2–5.8)
SODIUM SERPL-SCNC: 141 MMOL/L (ref 135–146)
WBC # BLD AUTO: 9.5 THOUSAND/UL (ref 3.8–10.8)

## 2022-08-01 DIAGNOSIS — R20.0 ANESTHESIA: Primary | ICD-10-CM

## 2022-08-02 ENCOUNTER — TELEPHONE (OUTPATIENT)
Dept: SURGERY | Facility: CLINIC | Age: 77
End: 2022-08-02

## 2022-08-02 ENCOUNTER — ANESTHESIA EVENT (OUTPATIENT)
Dept: PERIOP | Facility: HOSPITAL | Age: 77
End: 2022-08-02
Payer: COMMERCIAL

## 2022-08-02 NOTE — TELEPHONE ENCOUNTER
Received a message from Gail Whatley stating she is setting up an appointment for him to meet with Anesthesia before surgery

## 2022-08-05 NOTE — PRE-PROCEDURE INSTRUCTIONS
NPO after midnight  Understands when to expect preop phone call  Remove all jewelry  Advised of visitation policy  Before your operation, you play an important role in decreasing your risk for infection by washing with special antiseptic soap  This is an effective way to reduce bacteria on the skin which may help to prevent infections at the surgical site  Please read the following directions in advance  1  In the week before your operation purchase a 4 ounce bottle of antiseptic soap containing chlorhexidine gluconate 4%  Some brand names include: Aplicare, Endure, and Hibiclens  The cost is usually less than $5 00  · For your convenience, the [address] [place] [address] carries the soap  · It may also be available at your doctor's office or pre-admission testing center, and at most retail pharmacies  · If you are allergic or sensitive to soaps containing chlorhexidine gluconate (CHG), please let your doctor know so another antiseptic soap can be suggested  · CHG antiseptic soap is for external use only  2      The day before your operation follow these directions carefully to get ready  · Place clean lines (sheets) on your bed; you should sleep on clean sheets after your evening shower  · Get clean towels and washcloths ready - you need enough for 2 showers  · Set aside clean underwear, pajamas, and clothing to wear after the shower  Reminders:  · DO NOT use any other soap or body rinse on your skin during or after the antiseptic showers  · DO NOT use lotion , powder, deodorant, or perfume/aftershave of any kind on your skin after your antiseptic shower  · DO NOT shave any body parts in the 24 hours/the day before your operation  · DO NOT get the antiseptic soap in your eyes, ears, nose, mouth, or vaginal area  3      You will need to shower the night before AND the morning of your Surgery  Shower 1:  · The evening before your operation, take the fist shower    · First, shampoo your hair with regular shampoo and rinse it completely before you use the anitseptic soap  After washing and rinsing your hair, rinse your body  · Next, use a clean wash cloth to apply the antiseptic soap and wash your body from the neck down to your toes using 1/2 bottle of the antiseptic soap  You will use the other 1/2 bottle for the second shower  · Clean the area where your incision will be; later this area well for about 2 minutes  · If you ar having head or neck surgery, wash areas with the antiseptic soap  · Rinse yourself completely with running water  · Use a clean towel to dry off  · Wear clean underwear and clothing/pajamas  Shower 2:  · The Morning of your operation, take the second shower following the same steps as Shower 1 using the second 1/2 of the bottle of antiseptic soap  · Use clean cloths and towels to was and dry yourself off  · Wear clean underwear and clothing

## 2022-08-10 ENCOUNTER — ANESTHESIA (OUTPATIENT)
Dept: PERIOP | Facility: HOSPITAL | Age: 77
End: 2022-08-10
Payer: COMMERCIAL

## 2022-08-10 ENCOUNTER — HOSPITAL ENCOUNTER (OUTPATIENT)
Facility: HOSPITAL | Age: 77
Setting detail: OUTPATIENT SURGERY
Discharge: HOME | End: 2022-08-12
Attending: SURGERY | Admitting: SURGERY
Payer: COMMERCIAL

## 2022-08-10 DIAGNOSIS — K40.20 NON-RECURRENT BILATERAL INGUINAL HERNIA WITHOUT OBSTRUCTION OR GANGRENE: ICD-10-CM

## 2022-08-10 DIAGNOSIS — N18.9 ACUTE ON CHRONIC RENAL INSUFFICIENCY: ICD-10-CM

## 2022-08-10 DIAGNOSIS — N28.9 ACUTE ON CHRONIC RENAL INSUFFICIENCY: ICD-10-CM

## 2022-08-10 DIAGNOSIS — Z91.89 AT HIGH RISK FOR KIDNEY INJURY: Primary | ICD-10-CM

## 2022-08-10 PROBLEM — E78.5 HYPERLIPIDEMIA: Chronic | Status: RESOLVED | Noted: 2022-06-15 | Resolved: 2022-08-10

## 2022-08-10 LAB
GLUCOSE SERPL-MCNC: 95 MG/DL (ref 65–140)
GLUCOSE SERPL-MCNC: 95 MG/DL (ref 65–140)

## 2022-08-10 PROCEDURE — C9113 INJ PANTOPRAZOLE SODIUM, VIA: HCPCS | Performed by: SURGERY

## 2022-08-10 PROCEDURE — 82948 REAGENT STRIP/BLOOD GLUCOSE: CPT

## 2022-08-10 PROCEDURE — 49505 PRP I/HERN INIT REDUC >5 YR: CPT | Performed by: SURGERY

## 2022-08-10 PROCEDURE — C1781 MESH (IMPLANTABLE): HCPCS | Performed by: SURGERY

## 2022-08-10 PROCEDURE — NC001 PR NO CHARGE: Performed by: SURGERY

## 2022-08-10 DEVICE — BARD MESH
Type: IMPLANTABLE DEVICE | Site: INGUINAL | Status: FUNCTIONAL
Brand: BARD MESH

## 2022-08-10 RX ORDER — FENTANYL CITRATE/PF 50 MCG/ML
25 SYRINGE (ML) INJECTION
Status: DISCONTINUED | OUTPATIENT
Start: 2022-08-10 | End: 2022-08-10 | Stop reason: HOSPADM

## 2022-08-10 RX ORDER — CEFAZOLIN SODIUM 1 G/3ML
INJECTION, POWDER, FOR SOLUTION INTRAMUSCULAR; INTRAVENOUS AS NEEDED
Status: DISCONTINUED | OUTPATIENT
Start: 2022-08-10 | End: 2022-08-10

## 2022-08-10 RX ORDER — LIDOCAINE HYDROCHLORIDE 10 MG/ML
INJECTION, SOLUTION EPIDURAL; INFILTRATION; INTRACAUDAL; PERINEURAL AS NEEDED
Status: DISCONTINUED | OUTPATIENT
Start: 2022-08-10 | End: 2022-08-10

## 2022-08-10 RX ORDER — BUPIVACAINE HYDROCHLORIDE AND EPINEPHRINE 2.5; 5 MG/ML; UG/ML
INJECTION, SOLUTION INFILTRATION; PERINEURAL AS NEEDED
Status: DISCONTINUED | OUTPATIENT
Start: 2022-08-10 | End: 2022-08-10 | Stop reason: HOSPADM

## 2022-08-10 RX ORDER — SODIUM CHLORIDE, SODIUM LACTATE, POTASSIUM CHLORIDE, CALCIUM CHLORIDE 600; 310; 30; 20 MG/100ML; MG/100ML; MG/100ML; MG/100ML
20 INJECTION, SOLUTION INTRAVENOUS CONTINUOUS
Status: DISCONTINUED | OUTPATIENT
Start: 2022-08-10 | End: 2022-08-11

## 2022-08-10 RX ORDER — PANTOPRAZOLE SODIUM 40 MG/10ML
40 INJECTION, POWDER, LYOPHILIZED, FOR SOLUTION INTRAVENOUS ONCE
Status: COMPLETED | OUTPATIENT
Start: 2022-08-10 | End: 2022-08-10

## 2022-08-10 RX ORDER — PROPOFOL 10 MG/ML
INJECTION, EMULSION INTRAVENOUS AS NEEDED
Status: DISCONTINUED | OUTPATIENT
Start: 2022-08-10 | End: 2022-08-10

## 2022-08-10 RX ORDER — SODIUM CHLORIDE, SODIUM LACTATE, POTASSIUM CHLORIDE, CALCIUM CHLORIDE 600; 310; 30; 20 MG/100ML; MG/100ML; MG/100ML; MG/100ML
50 INJECTION, SOLUTION INTRAVENOUS CONTINUOUS
Status: DISCONTINUED | OUTPATIENT
Start: 2022-08-10 | End: 2022-08-10

## 2022-08-10 RX ORDER — ONDANSETRON 2 MG/ML
4 INJECTION INTRAMUSCULAR; INTRAVENOUS ONCE AS NEEDED
Status: DISCONTINUED | OUTPATIENT
Start: 2022-08-10 | End: 2022-08-10 | Stop reason: HOSPADM

## 2022-08-10 RX ORDER — PROPOFOL 10 MG/ML
INJECTION, EMULSION INTRAVENOUS CONTINUOUS PRN
Status: DISCONTINUED | OUTPATIENT
Start: 2022-08-10 | End: 2022-08-10

## 2022-08-10 RX ORDER — MAGNESIUM HYDROXIDE 1200 MG/15ML
LIQUID ORAL AS NEEDED
Status: DISCONTINUED | OUTPATIENT
Start: 2022-08-10 | End: 2022-08-10 | Stop reason: HOSPADM

## 2022-08-10 RX ORDER — CALCIUM CARBONATE 200(500)MG
500 TABLET,CHEWABLE ORAL 3 TIMES DAILY PRN
Status: DISCONTINUED | OUTPATIENT
Start: 2022-08-10 | End: 2022-08-12 | Stop reason: HOSPADM

## 2022-08-10 RX ORDER — ALBUMIN, HUMAN INJ 5% 5 %
SOLUTION INTRAVENOUS CONTINUOUS PRN
Status: DISCONTINUED | OUTPATIENT
Start: 2022-08-10 | End: 2022-08-10

## 2022-08-10 RX ORDER — EPHEDRINE SULFATE 50 MG/ML
INJECTION INTRAVENOUS AS NEEDED
Status: DISCONTINUED | OUTPATIENT
Start: 2022-08-10 | End: 2022-08-10

## 2022-08-10 RX ORDER — GLYCOPYRROLATE 0.2 MG/ML
INJECTION INTRAMUSCULAR; INTRAVENOUS AS NEEDED
Status: DISCONTINUED | OUTPATIENT
Start: 2022-08-10 | End: 2022-08-10

## 2022-08-10 RX ORDER — ROCURONIUM BROMIDE 10 MG/ML
INJECTION, SOLUTION INTRAVENOUS AS NEEDED
Status: DISCONTINUED | OUTPATIENT
Start: 2022-08-10 | End: 2022-08-10

## 2022-08-10 RX ORDER — ONDANSETRON 2 MG/ML
4 INJECTION INTRAMUSCULAR; INTRAVENOUS EVERY 6 HOURS PRN
Status: DISCONTINUED | OUTPATIENT
Start: 2022-08-10 | End: 2022-08-12 | Stop reason: HOSPADM

## 2022-08-10 RX ORDER — OXYCODONE HYDROCHLORIDE 5 MG/1
5 TABLET ORAL EVERY 4 HOURS PRN
Status: DISCONTINUED | OUTPATIENT
Start: 2022-08-10 | End: 2022-08-12 | Stop reason: HOSPADM

## 2022-08-10 RX ORDER — SODIUM CHLORIDE, SODIUM LACTATE, POTASSIUM CHLORIDE, CALCIUM CHLORIDE 600; 310; 30; 20 MG/100ML; MG/100ML; MG/100ML; MG/100ML
50 INJECTION, SOLUTION INTRAVENOUS CONTINUOUS
Status: DISCONTINUED | OUTPATIENT
Start: 2022-08-10 | End: 2022-08-11

## 2022-08-10 RX ORDER — HEPARIN SODIUM 5000 [USP'U]/ML
5000 INJECTION, SOLUTION INTRAVENOUS; SUBCUTANEOUS EVERY 8 HOURS SCHEDULED
Status: DISCONTINUED | OUTPATIENT
Start: 2022-08-10 | End: 2022-08-12 | Stop reason: HOSPADM

## 2022-08-10 RX ORDER — ONDANSETRON 2 MG/ML
INJECTION INTRAMUSCULAR; INTRAVENOUS AS NEEDED
Status: DISCONTINUED | OUTPATIENT
Start: 2022-08-10 | End: 2022-08-10

## 2022-08-10 RX ORDER — LABETALOL HYDROCHLORIDE 5 MG/ML
10 INJECTION, SOLUTION INTRAVENOUS EVERY 6 HOURS PRN
Status: DISCONTINUED | OUTPATIENT
Start: 2022-08-10 | End: 2022-08-12 | Stop reason: HOSPADM

## 2022-08-10 RX ORDER — LANOLIN ALCOHOL/MO/W.PET/CERES
3 CREAM (GRAM) TOPICAL
Status: DISCONTINUED | OUTPATIENT
Start: 2022-08-10 | End: 2022-08-12 | Stop reason: HOSPADM

## 2022-08-10 RX ORDER — METHOCARBAMOL 500 MG/1
500 TABLET, FILM COATED ORAL EVERY 6 HOURS SCHEDULED
Status: DISCONTINUED | OUTPATIENT
Start: 2022-08-10 | End: 2022-08-12 | Stop reason: HOSPADM

## 2022-08-10 RX ORDER — DOCUSATE SODIUM 100 MG/1
100 CAPSULE, LIQUID FILLED ORAL 2 TIMES DAILY
Status: DISCONTINUED | OUTPATIENT
Start: 2022-08-10 | End: 2022-08-12 | Stop reason: HOSPADM

## 2022-08-10 RX ORDER — ACETAMINOPHEN 325 MG/1
650 TABLET ORAL EVERY 6 HOURS SCHEDULED
Status: DISCONTINUED | OUTPATIENT
Start: 2022-08-10 | End: 2022-08-12 | Stop reason: HOSPADM

## 2022-08-10 RX ORDER — FENTANYL CITRATE 50 UG/ML
INJECTION, SOLUTION INTRAMUSCULAR; INTRAVENOUS AS NEEDED
Status: DISCONTINUED | OUTPATIENT
Start: 2022-08-10 | End: 2022-08-10

## 2022-08-10 RX ORDER — OXYCODONE HYDROCHLORIDE 5 MG/1
2.5 TABLET ORAL EVERY 4 HOURS PRN
Status: DISCONTINUED | OUTPATIENT
Start: 2022-08-10 | End: 2022-08-12 | Stop reason: HOSPADM

## 2022-08-10 RX ORDER — DEXAMETHASONE SODIUM PHOSPHATE 10 MG/ML
INJECTION, SOLUTION INTRAMUSCULAR; INTRAVENOUS AS NEEDED
Status: DISCONTINUED | OUTPATIENT
Start: 2022-08-10 | End: 2022-08-10

## 2022-08-10 RX ORDER — HYDROMORPHONE HCL/PF 1 MG/ML
0.5 SYRINGE (ML) INJECTION
Status: DISCONTINUED | OUTPATIENT
Start: 2022-08-10 | End: 2022-08-12 | Stop reason: HOSPADM

## 2022-08-10 RX ORDER — POLYETHYLENE GLYCOL 3350 17 G/17G
17 POWDER, FOR SOLUTION ORAL DAILY
COMMUNITY

## 2022-08-10 RX ADMIN — METHOCARBAMOL TABLETS 500 MG: 500 TABLET, COATED ORAL at 21:45

## 2022-08-10 RX ADMIN — SODIUM CHLORIDE 250 ML: 0.9 INJECTION, SOLUTION INTRAVENOUS at 11:10

## 2022-08-10 RX ADMIN — ALBUMIN (HUMAN): 12.5 INJECTION, SOLUTION INTRAVENOUS at 16:25

## 2022-08-10 RX ADMIN — EPHEDRINE SULFATE 10 MG: 50 INJECTION INTRAVENOUS at 16:35

## 2022-08-10 RX ADMIN — SODIUM CHLORIDE, SODIUM LACTATE, POTASSIUM CHLORIDE, AND CALCIUM CHLORIDE 20 ML/HR: .6; .31; .03; .02 INJECTION, SOLUTION INTRAVENOUS at 13:50

## 2022-08-10 RX ADMIN — ROCURONIUM BROMIDE 10 MG: 50 INJECTION INTRAVENOUS at 17:38

## 2022-08-10 RX ADMIN — ONDANSETRON 4 MG: 2 INJECTION INTRAMUSCULAR; INTRAVENOUS at 17:53

## 2022-08-10 RX ADMIN — CEFAZOLIN 2000 MG: 1 INJECTION, POWDER, FOR SOLUTION INTRAMUSCULAR; INTRAVENOUS at 16:15

## 2022-08-10 RX ADMIN — DOCUSATE SODIUM 100 MG: 100 CAPSULE, LIQUID FILLED ORAL at 21:45

## 2022-08-10 RX ADMIN — ROCURONIUM BROMIDE 10 MG: 50 INJECTION INTRAVENOUS at 16:52

## 2022-08-10 RX ADMIN — DEXAMETHASONE SODIUM PHOSPHATE 10 MG: 10 INJECTION, SOLUTION INTRAMUSCULAR; INTRAVENOUS at 15:58

## 2022-08-10 RX ADMIN — GLYCOPYRROLATE 0.2 MG: 0.2 INJECTION, SOLUTION INTRAMUSCULAR; INTRAVENOUS at 16:16

## 2022-08-10 RX ADMIN — REMIFENTANIL HYDROCHLORIDE 0.15 MCG/KG/MIN: 1 INJECTION, POWDER, LYOPHILIZED, FOR SOLUTION INTRAVENOUS at 15:58

## 2022-08-10 RX ADMIN — LIDOCAINE HYDROCHLORIDE 50 MG: 10 INJECTION, SOLUTION EPIDURAL; INFILTRATION; INTRACAUDAL; PERINEURAL at 15:58

## 2022-08-10 RX ADMIN — HEPARIN SODIUM 5000 UNITS: 5000 INJECTION INTRAVENOUS; SUBCUTANEOUS at 21:45

## 2022-08-10 RX ADMIN — PANTOPRAZOLE SODIUM 40 MG: 40 INJECTION, POWDER, FOR SOLUTION INTRAVENOUS at 21:45

## 2022-08-10 RX ADMIN — ALBUMIN (HUMAN): 12.5 INJECTION, SOLUTION INTRAVENOUS at 16:35

## 2022-08-10 RX ADMIN — PROPOFOL 150 MG: 10 INJECTION, EMULSION INTRAVENOUS at 15:58

## 2022-08-10 RX ADMIN — SODIUM CHLORIDE, SODIUM LACTATE, POTASSIUM CHLORIDE, AND CALCIUM CHLORIDE 50 ML/HR: .6; .31; .03; .02 INJECTION, SOLUTION INTRAVENOUS at 19:56

## 2022-08-10 RX ADMIN — FENTANYL CITRATE 100 MCG: 50 INJECTION INTRAMUSCULAR; INTRAVENOUS at 15:58

## 2022-08-10 RX ADMIN — ROCURONIUM BROMIDE 50 MG: 50 INJECTION INTRAVENOUS at 15:58

## 2022-08-10 RX ADMIN — ACETAMINOPHEN 650 MG: 325 TABLET ORAL at 21:45

## 2022-08-10 RX ADMIN — PROPOFOL 130 MCG/KG/MIN: 10 INJECTION, EMULSION INTRAVENOUS at 15:58

## 2022-08-10 NOTE — H&P
H&P- General Surgery  Shane Desai 78 y o  male MRN: [id number]  Unit/Bed#: OR [place] Encounter: [id number]        Assessment/Plan     Assessment:  76M w/ bilateral chronic inguinal hernias    Plan: To OR for Open hernia repair, possible unilateral vs bilateral  With mesh    History of Present Illness     HPI:  Shane Desai is a 78 y o  male who presents to the hospital today who has had groin discomfort for about a year for a scheduled open bilateral inguinal hernia repair with mesh  He denies nausea, vomiting, fevers or other complaints  He was consented for procedure and all questions were answered to patient satisfaction  Review of Systems   Gastrointestinal: Positive for abdominal pain  All other systems reviewed and are negative  Historical Information   Past Medical History:   Diagnosis Date    Anemia     Anxiety     Bilateral inguinal hernia 2022    Evaluated @ [facility]    Bronchitis     Chronic kidney disease     ckd 3    Cognitive communication disorder     Colitis     Hyperlipidemia     Pre-diabetes     Trauma     mechanical fall down stairs     Past Surgical History:   Procedure Laterality Date    CATARACT EXTRACTION      COLONOSCOPY      COLONOSCOPY W/ BIOPSIES  2001    Hosea Lyles MD    MOHS RECONSTRUCTION      basal ca     Social History   Social History     Substance and Sexual Activity   Alcohol Use Yes    Comment: rare     Social History     Substance and Sexual Activity   Drug Use Not Currently     Social History     Tobacco Use   Smoking Status Former Smoker    Years:     Quit date:     Years since quittin 6   Smokeless Tobacco Never Used     Family History: History reviewed  No pertinent family history  Meds/Allergies   PTA meds:   Prior to Admission Medications   Prescriptions Last Dose Informant Patient Reported?  Taking?   atorvastatin (LIPITOR) 10 mg tablet  Self Yes No   Sig: Take 10 mg by mouth every evening   polyethylene glycol (MIRALAX) 17 g packet 8/9/2022 at Unknown time  Yes Yes   Sig: Take 17 g by mouth daily      Facility-Administered Medications: None     No Known Allergies    Objective   First Vitals:   Blood Pressure: 117/79 (08/10/22 0958)  Pulse: 65 (08/10/22 0958)  Temperature: (!) 96 9 °F (36 1 °C) (08/10/22 0958)  Temp Source: Tympanic (08/10/22 0958)  Respirations: 16 (08/10/22 0958)  Height: 6' (182 9 cm) (08/10/22 0958)  Weight - Scale: 83 5 kg (184 lb) (08/10/22 0958)  SpO2: 98 % (08/10/22 0958)    Current Vitals:   Blood Pressure: 117/79 (08/10/22 0958)  Pulse: 65 (08/10/22 0958)  Temperature: (!) 96 9 °F (36 1 °C) (08/10/22 0958)  Temp Source: Tympanic (08/10/22 0958)  Respirations: 16 (08/10/22 0958)  Height: 6' (182 9 cm) (08/10/22 0958)  Weight - Scale: 83 5 kg (184 lb) (08/10/22 0958)  SpO2: 98 % (08/10/22 0958)      Intake/Output Summary (Last 24 hours) at 8/10/2022 1542  Last data filed at 8/10/2022 1350  Gross per 24 hour   Intake 250 ml   Output --   Net 250 ml       Invasive Devices  Report    Peripheral Intravenous Line  Duration           Peripheral IV 08/10/22 Right Hand <1 day                Physical Exam  Constitutional:       General: He is not in acute distress  HENT:      Head: Normocephalic  Nose: Nose normal       Mouth/Throat:      Mouth: Mucous membranes are moist    Eyes:      General: No scleral icterus  Cardiovascular:      Rate and Rhythm: Normal rate  Pulmonary:      Effort: Pulmonary effort is normal    Abdominal:      Palpations: Abdomen is soft  Comments: Bilateral inguinal hernias  Musculoskeletal:         General: Normal range of motion  Cervical back: Neck supple  Neurological:      Mental Status: He is alert and oriented to person, place, and time  Psychiatric:         Mood and Affect: Mood normal          Lab Results: I have personally reviewed pertinent lab results    , CBC: No results found for: WBC, HGB, HCT, MCV, PLT, ADJUSTEDWBC, MCH, MCHC, RDW, MPV, NRBC, CMP: No results found for: SODIUM, K, CL, CO2, ANIONGAP, BUN, CREATININE, GLUCOSE, CALCIUM, AST, ALT, ALKPHOS, PROT, BILITOT, EGFR  Imaging: I have personally reviewed pertinent reports  and I have personally reviewed pertinent films in PACS  EKG, Pathology, and Other Studies: I have personally reviewed pertinent reports     and I have personally reviewed pertinent films in PACS    Code Status: No Order  Advance Directive and Living Will:      Power of :    POLST:

## 2022-08-10 NOTE — ANESTHESIA POSTPROCEDURE EVALUATION
Post-Op Assessment Note    CV Status:  Stable  Pain Score: 0    Pain management: adequate     Mental Status:  Alert and awake   Hydration Status:  Euvolemic   PONV Controlled:  Controlled   Airway Patency:  Patent      Post Op Vitals Reviewed: Yes      Staff: CRNA         No complications documented      BP   151/79   Temp 97 9   Pulse 79   Resp 12   SpO2 100%

## 2022-08-10 NOTE — OP NOTE
OPERATIVE REPORT  PATIENT NAME: Ange Torres    :  [id number]  MRN: [id number]  Pt Location: [facility] OR ROOM 03    SURGERY DATE: 8/10/2022    Surgeon(s) and Role:     * Demetria Salas MD - Primary     * Lisa Gudino MD - Bong Morrow MD - Assisting    Preop Diagnosis:  Non-recurrent bilateral inguinal hernia without obstruction or gangrene [K40 20]    Post-Op Diagnosis Codes:     * Non-recurrent bilateral inguinal hernia without obstruction or gangrene [K40 20]    Procedure(s) (LRB):  REPAIR HERNIA INGUINAL (Bilateral)    Specimen(s):  * No specimens in log *    Estimated Blood Loss:   Minimal    Drains:  * No LDAs found *    Anesthesia Type:   General    Operative Indications:  Non-recurrent bilateral inguinal hernia without obstruction or gangrene [K40 20]      Operative Findings:  Bilateral Direct Inguinal hernia's with blown out floor    Repaired bilaterally with Tension free repair with polypropylene mesh    Preservation of the left ilioinguinal nerve    Right ilioinguinal nerve purposely sacrificed due to dense scarring from chronic hernia    Complications:   None    Procedure and Technique:    Ange Torres is a 78 y o  male was brought into the operative suite and identified visually and by arm band  The patient was placed in the supine position  Careful attention was made towards padding and positioning of the extremities  After sterile prep and drape, a timeout was completed  The prep was dry  Antibiotics were provided  Athrombic pumps in place  0 25% Marcaine with epinephrine was utilized throughout the procedure  An incision was made  within the left inguinal region  The incision was carried down through the skin and subcutaneous tissue  The superficial epigastric vein was clamped, ligated and  divided    The external oblique fibers were identified  The external ring was identified    The external oblique fibers were then split in the direction of their fibers  Hemostats were placed on the cut edges  A self-retaining retractor was then placed  Exploration of the inguinal canal commenced  The cremasteric fibers were then divided along the fiber direction of its fibers the cord structures  The cord was encircled in a atraumatic Penrose drain and preserved during dissection  Identification of a direct inguinal hernia was performed  High dissection was completed at the level of the internal ring  The hernia sac was ligated and returned to the abdomen  A polypropylene mesh was cut informed to the size the defect and sutured to the pubic tubercle with 2-0 Prolene  This was then secured bilaterally on the shelving edge and conjoined tendon with interrupted 2-0 Prolenes  The tails then encircled the cord at the internal ring in normal position  The external oblique fascia was closed with a running 2-0 Vicryl suture  3-0 Vicryl subcutaneous suture was placed into the Lenin's fascia   The skin was then closed with staples  We then turned our attention to the right hernia  0 25% Marcaine with epinephrine was utilized throughout the procedure  An incision was made  within the right inguinal region  The incision was carried down through the skin and subcutaneous tissue  The superficial epigastric vein was clamped, ligated and  divided    The external oblique fibers were identified  The external ring was identified  The external oblique fibers were then split in the direction of their fibers  Hemostats were placed on the cut edges  A self-retaining retractor was then placed  Exploration of the inguinal canal commenced  The cremasteric fibers were then divided along the fiber direction of its fibers the cord structures  The cord was encircled in a atraumatic Penrose drain and preserved during dissection  Identification of a direct inguinal hernia was performed  High dissection was completed at the level of the internal ring    The hernia sac was ligated and returned to the abdomen  A polypropylene mesh was cut informed to the size the defect and sutured to the pubic tubercle with 2-0 Prolene  This was then secured bilaterally on the shelving edge and conjoined tendon with interrupted 2-0 Prolenes  The tails then encircled the cord at the internal ring in normal position  The external oblique fascia was closed with a running 2-0 Vicryl suture  3-0 Vicryl subcutaneous suture was placed into the Lenin's fascia   The skin was then closed with staples  The patient was then awakened from general anesthesia and transferred to the recovery room in stable condition  Sponge and instrument count correct ×2      Dr Fabiano Franklin was present for the entire procedure    Patient Disposition:  PACU       SIGNATURE: Marianne Bacon MD  DATE: August 10, 2022  TIME: 6:22 PM

## 2022-08-11 PROBLEM — K40.20 NON-RECURRENT BILATERAL INGUINAL HERNIA WITHOUT OBSTRUCTION OR GANGRENE: Status: ACTIVE | Noted: 2022-08-11

## 2022-08-11 PROBLEM — N18.9 ACUTE ON CHRONIC RENAL INSUFFICIENCY: Status: ACTIVE | Noted: 2022-08-11

## 2022-08-11 PROBLEM — N28.9 ACUTE ON CHRONIC RENAL INSUFFICIENCY: Status: ACTIVE | Noted: 2022-08-11

## 2022-08-11 LAB
ANION GAP SERPL CALCULATED.3IONS-SCNC: 3 MMOL/L (ref 4–13)
BUN SERPL-MCNC: 30 MG/DL (ref 5–25)
CALCIUM SERPL-MCNC: 8.7 MG/DL (ref 8.3–10.1)
CHLORIDE SERPL-SCNC: 111 MMOL/L (ref 96–108)
CO2 SERPL-SCNC: 25 MMOL/L (ref 21–32)
CREAT SERPL-MCNC: 2.03 MG/DL (ref 0.6–1.3)
GFR SERPL CREATININE-BSD FRML MDRD: 31 ML/MIN/1.73SQ M
GLUCOSE SERPL-MCNC: 137 MG/DL (ref 65–140)
PLATELET # BLD AUTO: 181 THOUSANDS/UL (ref 149–390)
PMV BLD AUTO: 9.8 FL (ref 8.9–12.7)
POTASSIUM SERPL-SCNC: 4.8 MMOL/L (ref 3.5–5.3)
SODIUM SERPL-SCNC: 139 MMOL/L (ref 135–147)

## 2022-08-11 PROCEDURE — 80048 BASIC METABOLIC PNL TOTAL CA: CPT

## 2022-08-11 PROCEDURE — 85049 AUTOMATED PLATELET COUNT: CPT | Performed by: SURGERY

## 2022-08-11 PROCEDURE — 99205 OFFICE O/P NEW HI 60 MIN: CPT | Performed by: INTERNAL MEDICINE

## 2022-08-11 PROCEDURE — 99024 POSTOP FOLLOW-UP VISIT: CPT | Performed by: SURGERY

## 2022-08-11 RX ORDER — SODIUM CHLORIDE, SODIUM GLUCONATE, SODIUM ACETATE, POTASSIUM CHLORIDE, MAGNESIUM CHLORIDE, SODIUM PHOSPHATE, DIBASIC, AND POTASSIUM PHOSPHATE .53; .5; .37; .037; .03; .012; .00082 G/100ML; G/100ML; G/100ML; G/100ML; G/100ML; G/100ML; G/100ML
60 INJECTION, SOLUTION INTRAVENOUS CONTINUOUS
Status: DISCONTINUED | OUTPATIENT
Start: 2022-08-11 | End: 2022-08-12

## 2022-08-11 RX ORDER — SODIUM CHLORIDE, SODIUM GLUCONATE, SODIUM ACETATE, POTASSIUM CHLORIDE, MAGNESIUM CHLORIDE, SODIUM PHOSPHATE, DIBASIC, AND POTASSIUM PHOSPHATE .53; .5; .37; .037; .03; .012; .00082 G/100ML; G/100ML; G/100ML; G/100ML; G/100ML; G/100ML; G/100ML
500 INJECTION, SOLUTION INTRAVENOUS ONCE
Status: COMPLETED | OUTPATIENT
Start: 2022-08-11 | End: 2022-08-11

## 2022-08-11 RX ADMIN — HEPARIN SODIUM 5000 UNITS: 5000 INJECTION INTRAVENOUS; SUBCUTANEOUS at 05:04

## 2022-08-11 RX ADMIN — ACETAMINOPHEN 650 MG: 325 TABLET ORAL at 00:40

## 2022-08-11 RX ADMIN — ACETAMINOPHEN 650 MG: 325 TABLET ORAL at 05:04

## 2022-08-11 RX ADMIN — METHOCARBAMOL TABLETS 500 MG: 500 TABLET, COATED ORAL at 00:40

## 2022-08-11 RX ADMIN — METHOCARBAMOL TABLETS 500 MG: 500 TABLET, COATED ORAL at 05:04

## 2022-08-11 RX ADMIN — SODIUM CHLORIDE, SODIUM GLUCONATE, SODIUM ACETATE, POTASSIUM CHLORIDE, MAGNESIUM CHLORIDE, SODIUM PHOSPHATE, DIBASIC, AND POTASSIUM PHOSPHATE 500 ML: .53; .5; .37; .037; .03; .012; .00082 INJECTION, SOLUTION INTRAVENOUS at 11:16

## 2022-08-11 RX ADMIN — HEPARIN SODIUM 5000 UNITS: 5000 INJECTION INTRAVENOUS; SUBCUTANEOUS at 21:55

## 2022-08-11 RX ADMIN — DOCUSATE SODIUM 100 MG: 100 CAPSULE, LIQUID FILLED ORAL at 18:26

## 2022-08-11 RX ADMIN — HEPARIN SODIUM 5000 UNITS: 5000 INJECTION INTRAVENOUS; SUBCUTANEOUS at 15:46

## 2022-08-11 RX ADMIN — SODIUM CHLORIDE, SODIUM GLUCONATE, SODIUM ACETATE, POTASSIUM CHLORIDE, MAGNESIUM CHLORIDE, SODIUM PHOSPHATE, DIBASIC, AND POTASSIUM PHOSPHATE 60 ML/HR: .53; .5; .37; .037; .03; .012; .00082 INJECTION, SOLUTION INTRAVENOUS at 21:53

## 2022-08-11 RX ADMIN — SODIUM CHLORIDE, SODIUM GLUCONATE, SODIUM ACETATE, POTASSIUM CHLORIDE, MAGNESIUM CHLORIDE, SODIUM PHOSPHATE, DIBASIC, AND POTASSIUM PHOSPHATE 60 ML/HR: .53; .5; .37; .037; .03; .012; .00082 INJECTION, SOLUTION INTRAVENOUS at 13:32

## 2022-08-11 RX ADMIN — DOCUSATE SODIUM 100 MG: 100 CAPSULE, LIQUID FILLED ORAL at 08:34

## 2022-08-11 RX ADMIN — ACETAMINOPHEN 650 MG: 325 TABLET ORAL at 15:45

## 2022-08-11 RX ADMIN — METHOCARBAMOL TABLETS 500 MG: 500 TABLET, COATED ORAL at 18:26

## 2022-08-11 NOTE — QUICK NOTE
Post Op Check Note - Surgery Resident  Joi Jauregui 78 y o  male MRN: [id number]  Unit/Bed#: -01 Encounter: [id number]    ASSESSMENT:  Joi Jauregui is a 78 y o  male who is status post bilateral inguinal hernia repair  Afebrile, without tachycardia, hypotension, or desaturations on room air  PLAN  -Pain control  -Regular diet as tolerated   -Encourage IS use  -Encourage ambulation    Subjective: Patient was examined at bedside  The patient was resting comfortably in his room  Nursing reported mild confusion and serosanguinous saturation of the right incision dressing requiring dressing change  Patient reports pain 3/10 in the lower abdomen  He did not eat dinner but denies nausea/ vomiting  No bowel movements but patient is passing gas and urinating  Patient denies fever/ chills/ SOB  Physical Exam:  GEN: NAD  CV: RRR  Lung: Normal effort  Ab: Soft, NT/ND  Neuro: A+Ox3  Incisions: x2 dressed, not saturated    Blood pressure 140/78, pulse 62, temperature 98 °F (36 7 °C), temperature source Oral, resp  rate 18, height 6' (1 829 m), weight 83 5 kg (184 lb), SpO2 98 %  ,Body mass index is 24 95 kg/m²        Intake/Output Summary (Last 24 hours) at 8/11/2022 0502  Last data filed at 8/11/2022 0101  Gross per 24 hour   Intake 750 ml   Output 600 ml   Net 150 ml       Invasive Devices  Report    Peripheral Intravenous Line  Duration           Peripheral IV 08/11/22 Distal;Right;Ventral (anterior) Forearm <1 day                VTE Pharmacologic Prophylaxis: Sequential compression device (Venodyne)  and Heparin

## 2022-08-11 NOTE — CONSULTS
[address] [place] 78 y o  male MRN: [id number]  Unit/Bed#: -01 Encounter: [id number]    ASSESSMENT and PLAN:  1) [place]   Hx of CKD stage III, baseline Cr of 1 5 (7/15/22)  Does not follow with a nephrologist  Does not  have routine care with a PCP  CT chest/abd/pelvis: negative for hydronephrosis or cysts 5/2022   UA 5/2022: trace proteinuria   Post-operative Cr: 2 03, 500 mL bolus of isolyte; 60 mL/hr maintenance fluid  Recheck BMP tomorrow   Will need to establish care with nephrologist  2) CKD   Hx of CKD stage III, baseline Cr of 1 5   UA: 5/2022 trace proteinuria    Will need to establish care with outpatient nephrology for  3) Hypertension   No hx of HTN  //82  4) volume status   euvolemic on exam  5) electrolytes   Stable, repeat BMP tomorrow  HISTORY OF PRESENT ILLNESS:  Requesting Physician: Sadi Prakash MD  Reason for Consult: high risk [place]    Dennigel Lugo is a 78 y o  male who had an elective b/l  Inguinal hernia repair yesterday 8/10/22  A renal consultation is requested today for assistance in the management of high-risk for [place]  He has a PMHx of HLD, pre-diabetes (A1C 5 6 5/2022), and CKD III  He does not routinely follow with his PCP Dr Shoshana Maldonado  Per chart review, most recent labs include a pre-operative CMP on 7/15/22 with a Cr of 1 57 and 4/26/22 with a Cr of 1 53  His documented home medications include atorvastatin 10 mg and miralax  He has recently self discontinued his atorvastatin  He does not take any supplements or vitamins  No NSAID use  No recent imaging with contrast  No family hx of CKD  He was a former smoker, 25 pack year, quit in 2000  He drinks 1, 12 ounce beer nightly  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  Pt states that his inguinal hernias have been present for >1 year and would cause post-prandial pain  Since his surgery yesterday, he is without any abdominal pain   He denies any changes in urinary output, stream, frequency, or urgency  He denies any current nausea or vomiting  Notes he has chronic loose stools  He denies any chest pain, SOB, or recent illnesses  ROS:  All other systems negative    PAST MEDICAL HISTORY:  Past Medical History:   Diagnosis Date    Anemia     Anxiety     Bilateral inguinal hernia 2022    Evaluated @ [facility]    Bronchitis     Chronic kidney disease     ckd 3    Cognitive communication disorder     Colitis     Hyperlipidemia     Pre-diabetes     Trauma     mechanical fall down stairs       PAST SURGICAL HISTORY:  Past Surgical History:   Procedure Laterality Date    CATARACT EXTRACTION      COLONOSCOPY      COLONOSCOPY W/ BIOPSIES  2001    Arnie Cook MD    MOHS RECONSTRUCTION      basal ca       ALLERGIES:  No Known Allergies    SOCIAL HISTORY:  Social History     Substance and Sexual Activity   Alcohol Use Yes    Comment: rare     Social History     Substance and Sexual Activity   Drug Use Not Currently     Social History     Tobacco Use   Smoking Status Former Smoker    Years:     Quit date:     Years since quittin 6   Smokeless Tobacco Never Used       FAMILY HISTORY:  History reviewed  No pertinent family history      MEDICATIONS:    Current Facility-Administered Medications:     acetaminophen (TYLENOL) tablet 650 mg, 650 mg, Oral, Q6H [address], Marianne Mendez MD, 650 mg at 22 0504    calcium carbonate (TUMS) chewable tablet 500 mg, 500 mg, Oral, TID PRN, Marianne Mendez MD    docusate sodium (COLACE) capsule 100 mg, 100 mg, Oral, BID, Marianne Mendez MD, 100 mg at 22 0834    heparin (porcine) subcutaneous injection 5,000 Units, 5,000 Units, Subcutaneous, Q8H [address], 5,000 Units at 22 0504 **AND** Platelet count, , , Once, Marianne Mendez MD    HYDROmorphone (DILAUDID) injection 0 5 mg, 0 5 mg, Intravenous, Q3H PRN, Marianne Mendez MD    labetalol (NORMODYNE) injection 10 mg, 10 mg, Intravenous, Q6H PRN, Shannon Oakes MD    lactated ringers infusion, 20 mL/hr, Intravenous, Continuous, Jose Clarenoam [facility] [place], Last Rate: 600 mL/hr at 08/10/22 1807, Rate Change at 08/10/22 1807    melatonin tablet 3 mg, 3 mg, Oral, HS PRN, Shannon Oakes MD    methocarbamol (ROBAXIN) tablet 500 mg, 500 mg, Oral, Q6H [address], Shannon Oakes MD, 500 mg at 08/11/22 0504    ondansetron (ZOFRAN) injection 4 mg, 4 mg, Intravenous, Q6H PRN, Shannon Oaeks MD    oxyCODONE (ROXICODONE) IR tablet 2 5 mg, 2 5 mg, Oral, Q4H PRN, Shannon Oakes MD    oxyCODONE (ROXICODONE) IR tablet 5 mg, 5 mg, Oral, Q4H PRN, Shannon Oakes MD    REVIEW OF SYSTEMS:  Constitutional: Negative for fatigue, anorexia, fever, chills, diaphoresis  HENT: Negative for postnasal drip  Eyes: Negative for visual disturbance  Respiratory: Negative for cough, shortness of breath and wheezing  Cardiovascular: Negative for chest pain, palpitations and leg swelling  Gastrointestinal: Negative for abdominal pain, constipation, diarrhea, nausea and vomiting  Genitourinary: No dysuria, hematuria  Endocrine: Negative for polyuria  Musculoskeletal: Negative for arthralgias, back pain and joint swelling  Skin: Negative for rash  Neurological: Negative for focal weakness, headaches, dizziness  Hematological: Negative for easy bruising or bleeding  Psychiatric/Behavioral: Negative for confusion and sleep disturbance  All the systems were reviewed and were negative except as documented on the HPI      PHYSICAL EXAM:  Current Weight: Weight - Scale: 83 5 kg (184 lb)  First Weight: Weight - Scale: 83 5 kg (184 lb)  Vitals:    08/10/22 1945 08/10/22 2051 08/10/22 2243 08/11/22 0330   BP: 146/82 143/87 128/67 140/78   BP Location:    Right arm   Pulse: 62 74  62   Resp: (!) 23   18   Temp: 98 7 °F (37 1 °C) (!) 97 3 °F (36 3 °C) 97 5 °F (36 4 °C) 98 °F (36 7 °C)   TempSrc: Oral   SpO2: 98% 96%  98%   Weight:       Height:           Intake/Output Summary (Last 24 hours) at 8/11/2022 0934  Last data filed at 8/11/2022 0101  Gross per 24 hour   Intake 750 ml   Output 600 ml   Net 150 ml     Physical Exam  Constitutional:       General: He is not in acute distress  Appearance: He is normal weight  He is not ill-appearing  HENT:      Head: Normocephalic and atraumatic  Eyes:      General: No scleral icterus  Extraocular Movements: Extraocular movements intact  Conjunctiva/sclera: Conjunctivae normal    Cardiovascular:      Rate and Rhythm: Normal rate and regular rhythm  Pulses: Normal pulses  Pulmonary:      Effort: Pulmonary effort is normal  No respiratory distress  Breath sounds: No wheezing or rales  Abdominal:      General: Abdomen is flat  There is no distension  Palpations: Abdomen is soft  Tenderness: There is no abdominal tenderness  Musculoskeletal:      Right lower leg: No edema  Left lower leg: No edema  Skin:     General: Skin is warm and dry  Capillary Refill: Capillary refill takes less than 2 seconds  Neurological:      Mental Status: He is alert and oriented to person, place, and time     Psychiatric:         Mood and Affect: Mood normal          Behavior: Behavior normal            Invasive Devices:      Lab Results:         Invalid input(s): ALBUMIN  Other Studies:    Latest Reference Range & Units 08/11/22 10:19   Sodium 135 - 147 mmol/L 139   Potassium 3 5 - 5 3 mmol/L 4 8   Chloride 96 - 108 mmol/L 111 (H)   CO2 21 - 32 mmol/L 25   Anion Gap 4 - 13 mmol/L 3 (L)   BUN 5 - 25 mg/dL 30 (H)   Creatinine 0 60 - 1 30 mg/dL 2 03 (H)   Glucose, Random 65 - 140 mg/dL 137   Calcium 8 3 - 10 1 mg/dL 8 7   eGFR ml/min/1 73sq m 31   (H): Data is abnormally high  (L): Data is abnormally low      Celia Mccauley DO  PGY-1

## 2022-08-11 NOTE — PLAN OF CARE
Problem: MOBILITY - ADULT  Goal: Maintain or return to baseline ADL function  Description: INTERVENTIONS:  -  Assess patient's ability to carry out ADLs; assess patient's baseline for ADL function and identify physical deficits which impact ability to perform ADLs (bathing, care of mouth/teeth, toileting, grooming, dressing, etc )  - Assess/evaluate cause of self-care deficits   - Assess range of motion  - Assess patient's mobility; develop plan if impaired  - Assess patient's need for assistive devices and provide as appropriate  - Encourage maximum independence but intervene and supervise when necessary  - Involve family in performance of ADLs  - Assess for home care needs following discharge   - Consider OT consult to assist with ADL evaluation and planning for discharge  - Provide patient education as appropriate  Outcome: Progressing  Goal: Maintains/Returns to pre admission functional level  Description: INTERVENTIONS:  - Perform BMAT or MOVE assessment daily    - Set and communicate daily mobility goal to care team and patient/family/caregiver  - Collaborate with rehabilitation services on mobility goals if consulted  - Perform Range of Motion  times a day  - Reposition patient every  hours    - Dangle patient times a day  - Stand patient  times a day  - Ambulate patient times a day  - Out of bed to chair  times a day   - Out of bed for meals times a day  - Out of bed for toileting  - Record patient progress and toleration of activity level   Outcome: Progressing     Problem: Potential for Falls  Goal: Patient will remain free of falls  Description: INTERVENTIONS:  - Educate patient/family on patient safety including physical limitations  - Instruct patient to call for assistance with activity   - Consult OT/PT to assist with strengthening/mobility   - Keep Call bell within reach  - Keep bed low and locked with side rails adjusted as appropriate  - Keep care items and personal belongings within reach  - Initiate and maintain comfort rounds  - Make Fall Risk Sign visible to staff  - Offer Toileting every Hours, in advance of need  - Initiate/Maintain alarm  - Obtain necessary fall risk management equipmen  - Apply yellow socks and bracelet for high fall risk patients  - Consider moving patient to room near nurses station  Outcome: Progressing

## 2022-08-11 NOTE — PLAN OF CARE
Problem: MOBILITY - ADULT  Goal: Maintain or return to baseline ADL function  Description: INTERVENTIONS:  -  Assess patient's ability to carry out ADLs; assess patient's baseline for ADL function and identify physical deficits which impact ability to perform ADLs (bathing, care of mouth/teeth, toileting, grooming, dressing, etc )  - Assess/evaluate cause of self-care deficits   - Assess range of motion  - Assess patient's mobility; develop plan if impaired  - Assess patient's need for assistive devices and provide as appropriate  - Encourage maximum independence but intervene and supervise when necessary  - Involve family in performance of ADLs  - Assess for home care needs following discharge   - Consider OT consult to assist with ADL evaluation and planning for discharge  - Provide patient education as appropriate  Outcome: Progressing  Goal: Maintains/Returns to pre admission functional level  Description: INTERVENTIONS:  - Perform BMAT or MOVE assessment daily    - Set and communicate daily mobility goal to care team and patient/family/caregiver     - Collaborate with rehabilitation services on mobility goals if consulted  - Out of bed for toileting  - Record patient progress and toleration of activity level   Outcome: Progressing     Problem: Potential for Falls  Goal: Patient will remain free of falls  Description: INTERVENTIONS:  - Educate patient/family on patient safety including physical limitations  - Instruct patient to call for assistance with activity   - Consult OT/PT to assist with strengthening/mobility   - Keep Call bell within reach  - Keep bed low and locked with side rails adjusted as appropriate  - Keep care items and personal belongings within reach  - Initiate and maintain comfort rounds  - Make Fall Risk Sign visible to staff  - Apply yellow socks and bracelet for high fall risk patients  - Consider moving patient to room near nurses station  Outcome: Progressing     Problem: Prexisting or High Potential for Compromised Skin Integrity  Goal: Skin integrity is maintained or improved  Description: INTERVENTIONS:  - Identify patients at risk for skin breakdown  - Assess and monitor skin integrity  - Assess and monitor nutrition and hydration status  - Monitor labs   - Assess for incontinence   - Turn and reposition patient  - Assist with mobility/ambulation  - Relieve pressure over bony prominences  - Avoid friction and shearing  - Provide appropriate hygiene as needed including keeping skin clean and dry  - Evaluate need for skin moisturizer/barrier cream  - Collaborate with interdisciplinary team   - Patient/family teaching  - Consider wound care consult   Outcome: Progressing     Problem: PAIN - ADULT  Goal: Verbalizes/displays adequate comfort level or baseline comfort level  Description: Interventions:  - Encourage patient to monitor pain and request assistance  - Assess pain using appropriate pain scale  - Administer analgesics based on type and severity of pain and evaluate response  - Implement non-pharmacological measures as appropriate and evaluate response  - Consider cultural and social influences on pain and pain management  - Notify physician/advanced practitioner if interventions unsuccessful or patient reports new pain  Outcome: Progressing     Problem: INFECTION - ADULT  Goal: Absence or prevention of progression during hospitalization  Description: INTERVENTIONS:  - Assess and monitor for signs and symptoms of infection  - Monitor lab/diagnostic results  - Monitor all insertion sites, i e  indwelling lines, tubes, and drains  - Monitor endotracheal if appropriate and nasal secretions for changes in amount and color  - Purdy appropriate cooling/warming therapies per order  - Administer medications as ordered  - Instruct and encourage patient and family to use good hand hygiene technique  - Identify and instruct in appropriate isolation precautions for identified infection/condition  Outcome: Progressing  Goal: Absence of fever/infection during neutropenic period  Description: INTERVENTIONS:  - Monitor WBC    Outcome: Progressing     Problem: SAFETY ADULT  Goal: Maintain or return to baseline ADL function  Description: INTERVENTIONS:  -  Assess patient's ability to carry out ADLs; assess patient's baseline for ADL function and identify physical deficits which impact ability to perform ADLs (bathing, care of mouth/teeth, toileting, grooming, dressing, etc )  - Assess/evaluate cause of self-care deficits   - Assess range of motion  - Assess patient's mobility; develop plan if impaired  - Assess patient's need for assistive devices and provide as appropriate  - Encourage maximum independence but intervene and supervise when necessary  - Involve family in performance of ADLs  - Assess for home care needs following discharge   - Consider OT consult to assist with ADL evaluation and planning for discharge  - Provide patient education as appropriate  Outcome: Progressing  Goal: Maintains/Returns to pre admission functional level  Description: INTERVENTIONS:  - Perform BMAT or MOVE assessment daily    - Set and communicate daily mobility goal to care team and patient/family/caregiver     - Collaborate with rehabilitation services on mobility goals if consulted  - Out of bed for toileting  - Record patient progress and toleration of activity level   Outcome: Progressing  Goal: Patient will remain free of falls  Description: INTERVENTIONS:  - Educate patient/family on patient safety including physical limitations  - Instruct patient to call for assistance with activity   - Consult OT/PT to assist with strengthening/mobility   - Keep Call bell within reach  - Keep bed low and locked with side rails adjusted as appropriate  - Keep care items and personal belongings within reach  - Initiate and maintain comfort rounds  - Make Fall Risk Sign visible to staff  - Apply yellow socks and bracelet for high fall risk patients  - Consider moving patient to room near nurses station  Outcome: Progressing     Problem: DISCHARGE PLANNING  Goal: Discharge to home or other facility with appropriate resources  Description: INTERVENTIONS:  - Identify barriers to discharge w/patient and caregiver  - Arrange for needed discharge resources and transportation as appropriate  - Identify discharge learning needs (meds, wound care, etc )  - Arrange for interpretive services to assist at discharge as needed  - Refer to Case Management Department for coordinating discharge planning if the patient needs post-hospital services based on physician/advanced practitioner order or complex needs related to functional status, cognitive ability, or social support system  Outcome: Progressing     Problem: Knowledge Deficit  Goal: Patient/family/caregiver demonstrates understanding of disease process, treatment plan, medications, and discharge instructions  Description: Complete learning assessment and assess knowledge base    Interventions:  - Provide teaching at level of understanding  - Provide teaching via preferred learning methods  Outcome: Progressing

## 2022-08-11 NOTE — QUICK NOTE
Nurse-Patient-Provider rounds were completed with the patient's nurse today, Pola Padgett  We discussed the plan is to continue med/surg status with maintenance IVF after completion of 500cc bolus  Will get repeat BMP tomorrow am to evaluate renal function  Patient currently pleasantly mildly confused and redirectable  We reviewed all of the invasive devices/lines/telemetry orders   - Susana Marquez DVT Prophylaxis:  Heparin sub q    Pain Assessment / Plan:  - Continue current analgesic regimen  Mobility Assessment / Plan:  - Activity as tolerated  Goals / Barriers for discharge: [place] ongoing IVF hydration  Case management following; case and discharge needs discussed  All questions and concerns were addressed  I spent greater than 20 minutes reviewing the plan with the patient and the nurse, and coordinating care for the day      Lashell Becker PA-C  8/11/2022 1941

## 2022-08-11 NOTE — QUICK NOTE
Called patient's brother Dr Shikha Li to update him on the plan of care  Plan is to continue inpatient admission at this time with repeat BMP in am to evaluate kidney function  Continue IVF  Renal consultation complete and Dr Juwan Larson was present at that time  All questions answered

## 2022-08-11 NOTE — PROGRESS NOTES
Progress Note - General Surgery Red  Archana Clock 78 y o  male MRN: [id number]  Unit/Bed#: -01 Encounter: [id number]    Assessment:  Lety Ceja is a 77 yo M who presented with bilateral inguinal hernias    POD 1 s/p bilateral inguinal hernia repair  Pt is afebrile and all other VSS  His pain is well controlled  Incisions are c/d/i  Stool output: none  Urine output: 600cc    Plan:  - continue current pain regimen  - regular diet  - encourage IS use  - encourage ambulation    Subjective/Objective   Subjective: Pt was up and walking around on exam this morning, reported doing "pretty well " He endorsed urinating without issues and passing gas, but no BM  He reported his pain is well controlled and denied fever, chills, nausea, vomitting, SOB  Objective:    Blood pressure 140/78, pulse 62, temperature 98 °F (36 7 °C), temperature source Oral, resp  rate 18, height 6' (1 829 m), weight 83 5 kg (184 lb), SpO2 98 %  ,Body mass index is 24 95 kg/m²  I/O last 24 hours:   In: 750 [IV Piggyback:750]  Out: 600 [Urine:600]    Invasive Devices  Report    Peripheral Intravenous Line  Duration           Peripheral IV 08/11/22 Distal;Right;Ventral (anterior) Forearm <1 day                Physical Exam: General appearance: alert and oriented, in no acute distress  Head: Normocephalic, without obvious abnormality, atraumatic  Lungs: Normal effort, no respiratory distress  Heart: regular rate and rhythm  Abdomen: soft, non-tender; bowel sounds normal; no masses,  no organomegaly  Pulses: 2+ and symmetric  Skin: Skin color, texture, turgor normal  No rashes or lesions  Neurologic: Grossly normal    Lab, Imaging and other studies:  Lab Results   Component Value Date    WBC 9 5 07/15/2022    HGB 13 0 (L) 07/15/2022    HCT 38 3 (L) 07/15/2022    MCV 96 0 07/15/2022     07/15/2022      Lab Results   Component Value Date    CALCIUM 9 0 07/15/2022    K 4 4 07/15/2022    CO2 28 07/15/2022     07/15/2022    BUN 28 (H) 07/15/2022    CREATININE 1 57 (H) 07/15/2022       VTE Pharmacologic Prophylaxis: Heparin  VTE Mechanical Prophylaxis: sequential compression device    Ganesh Varela (MS-4)  [facility]

## 2022-08-11 NOTE — DISCHARGE INSTRUCTIONS
1  General: You will feel pulling sensations around the wound and/or aches and pains around the incisions  This is normal  Even minor surgery is a change in your body and this is your bodys way of reaction to it  If you have had abdominal surgery, it may help to support the incision with a small pillow or blanket for comfort when moving or coughing  2  Wound care:    Bandage/Dressing - Make sure to remove the bandage in about 24 hours, unless instructed otherwise  You usually don't have to redress the wound after 24-48 hours, unless for comfort  Keep the incision clean and dry  Let air get to it  3  Water: You may shower over the wound  Do not bathe or use a pool or hot tub until cleared by the physician  You may shower right over the staples, glue or Steri-Strips and rinse wound with soapy water but do not scrub incision pat dry when you are done  4  Activity: You may go up and down stairs, walk as much as you are comfortable, but walk at least 3 times each day  If you have had abdominal or hernia surgery, do not lift anything heavier than 15 pounds for at least 2 weeks and nothing more than 25 pounds for weeks 3 and 4, unless cleared by the doctor  5  Diet: You may resume a regular diet  If you had a same-day surgery or overnight stay surgery, you may wish to eat lightly for a few days: soups, crackers, and sandwiches  You may resume a regular diet when ready  6  Medications: Resume all of your previous medications, unless told otherwise by the doctor  Avoid aspirin products for 3-5 days after the date of surgery  You may, at that time, began to take them again  Tylenol and ibuoprofen is always fine, unless you are taking any narcotic pain medication containing Tylenol (such as Percocet, Darvocet, Vicodin, or anything containing acetaminophen)  Do not take Tylenol if you're taking these medications   You do not need to take the narcotic pain medications unless you are having significant pain and discomfort  7  Driving: He will need someone to drive you home on the day of surgery  Do not drive or make any important decisions while on narcotic pain medication or 24 hours and after anesthesia or sedation for surgery  Generally, you may drive when your off all narcotic pain medications  8  Upset Stomach: You may take Maalox, Tums, or similar items for an upset stomach  If your narcotic pain medication causes an upset stomach, do not take it on an empty stomach  Try taking it with at least some crackers or toast      9  Constipation: Patients often experienced constipation after surgery  You may take over-the-counter medication for this, such as Metamucil, Senokot, Dulcolax, milk of magnesia, etc  You may take a suppository unless you have had anorectal surgery such as a procedure on your hemorrhoids  If you experience significant nausea or vomiting after abdominal surgery, call the office before trying any of these medications  10  Call the office: If you are experiencing any of the following, fevers above 101 5°, significant nausea or vomiting, if the wound develops drainage and/or is excessive redness around the wound, or if you have significant diarrhea or other worsening symptoms  11  Pain: You may be given a prescription for pain  This will be given to the hospital, the day of surgery  12  Sexual Activity: You may resume sexual activity when you feel ready and comfortable and your incision is sealed and healed without apparent infection risk

## 2022-08-12 ENCOUNTER — TELEPHONE (OUTPATIENT)
Dept: NEPHROLOGY | Facility: CLINIC | Age: 77
End: 2022-08-12

## 2022-08-12 VITALS
HEART RATE: 70 BPM | TEMPERATURE: 97.9 F | SYSTOLIC BLOOD PRESSURE: 109 MMHG | BODY MASS INDEX: 24.92 KG/M2 | DIASTOLIC BLOOD PRESSURE: 51 MMHG | WEIGHT: 184 LBS | RESPIRATION RATE: 18 BRPM | OXYGEN SATURATION: 98 % | HEIGHT: 72 IN

## 2022-08-12 DIAGNOSIS — N28.9 ACUTE ON CHRONIC RENAL INSUFFICIENCY: Primary | ICD-10-CM

## 2022-08-12 DIAGNOSIS — N18.9 ACUTE ON CHRONIC RENAL INSUFFICIENCY: Primary | ICD-10-CM

## 2022-08-12 DIAGNOSIS — N18.9 CHRONIC KIDNEY DISEASE, UNSPECIFIED CKD STAGE: ICD-10-CM

## 2022-08-12 LAB
ANION GAP SERPL CALCULATED.3IONS-SCNC: 5 MMOL/L (ref 4–13)
BACTERIA UR QL AUTO: NORMAL /HPF
BILIRUB UR QL STRIP: NEGATIVE
BUN SERPL-MCNC: 26 MG/DL (ref 5–25)
CALCIUM SERPL-MCNC: 8.3 MG/DL (ref 8.3–10.1)
CHLORIDE SERPL-SCNC: 110 MMOL/L (ref 96–108)
CLARITY UR: CLEAR
CO2 SERPL-SCNC: 25 MMOL/L (ref 21–32)
COLOR UR: COLORLESS
CREAT SERPL-MCNC: 1.61 MG/DL (ref 0.6–1.3)
ERYTHROCYTE [DISTWIDTH] IN BLOOD BY AUTOMATED COUNT: 12.7 % (ref 11.6–15.1)
GFR SERPL CREATININE-BSD FRML MDRD: 41 ML/MIN/1.73SQ M
GLUCOSE SERPL-MCNC: 103 MG/DL (ref 65–140)
GLUCOSE UR STRIP-MCNC: NEGATIVE MG/DL
HCT VFR BLD AUTO: 37 % (ref 36.5–49.3)
HGB BLD-MCNC: 11.9 G/DL (ref 12–17)
HGB UR QL STRIP.AUTO: NEGATIVE
KETONES UR STRIP-MCNC: NEGATIVE MG/DL
LEUKOCYTE ESTERASE UR QL STRIP: NEGATIVE
MCH RBC QN AUTO: 32.6 PG (ref 26.8–34.3)
MCHC RBC AUTO-ENTMCNC: 32.2 G/DL (ref 31.4–37.4)
MCV RBC AUTO: 101 FL (ref 82–98)
NITRITE UR QL STRIP: NEGATIVE
NON-SQ EPI CELLS URNS QL MICRO: NORMAL /HPF
PH UR STRIP.AUTO: 5 [PH]
PLATELET # BLD AUTO: 158 THOUSANDS/UL (ref 149–390)
PMV BLD AUTO: 10.5 FL (ref 8.9–12.7)
POTASSIUM SERPL-SCNC: 4 MMOL/L (ref 3.5–5.3)
PROT UR STRIP-MCNC: NEGATIVE MG/DL
RBC # BLD AUTO: 3.65 MILLION/UL (ref 3.88–5.62)
RBC #/AREA URNS AUTO: NORMAL /HPF
SODIUM SERPL-SCNC: 140 MMOL/L (ref 135–147)
SP GR UR STRIP.AUTO: 1.01 (ref 1–1.03)
UROBILINOGEN UR STRIP-ACNC: <2 MG/DL
WBC # BLD AUTO: 11.48 THOUSAND/UL (ref 4.31–10.16)
WBC #/AREA URNS AUTO: NORMAL /HPF

## 2022-08-12 PROCEDURE — 80048 BASIC METABOLIC PNL TOTAL CA: CPT | Performed by: INTERNAL MEDICINE

## 2022-08-12 PROCEDURE — NC001 PR NO CHARGE: Performed by: SURGERY

## 2022-08-12 PROCEDURE — 99238 HOSP IP/OBS DSCHRG MGMT 30/<: CPT | Performed by: SURGERY

## 2022-08-12 PROCEDURE — 85027 COMPLETE CBC AUTOMATED: CPT | Performed by: INTERNAL MEDICINE

## 2022-08-12 PROCEDURE — 81001 URINALYSIS AUTO W/SCOPE: CPT | Performed by: INTERNAL MEDICINE

## 2022-08-12 PROCEDURE — 99024 POSTOP FOLLOW-UP VISIT: CPT | Performed by: SURGERY

## 2022-08-12 PROCEDURE — 99214 OFFICE O/P EST MOD 30 MIN: CPT | Performed by: INTERNAL MEDICINE

## 2022-08-12 RX ORDER — METHOCARBAMOL 500 MG/1
500 TABLET, FILM COATED ORAL EVERY 6 HOURS SCHEDULED
Qty: 20 TABLET | Refills: 0 | Status: SHIPPED | OUTPATIENT
Start: 2022-08-12

## 2022-08-12 RX ORDER — ACETAMINOPHEN 325 MG/1
975 TABLET ORAL EVERY 8 HOURS PRN
Refills: 0
Start: 2022-08-12

## 2022-08-12 RX ADMIN — DOCUSATE SODIUM 100 MG: 100 CAPSULE, LIQUID FILLED ORAL at 08:42

## 2022-08-12 RX ADMIN — ACETAMINOPHEN 650 MG: 325 TABLET ORAL at 04:55

## 2022-08-12 RX ADMIN — METHOCARBAMOL TABLETS 500 MG: 500 TABLET, COATED ORAL at 04:56

## 2022-08-12 RX ADMIN — METHOCARBAMOL TABLETS 500 MG: 500 TABLET, COATED ORAL at 11:17

## 2022-08-12 RX ADMIN — HEPARIN SODIUM 5000 UNITS: 5000 INJECTION INTRAVENOUS; SUBCUTANEOUS at 04:57

## 2022-08-12 RX ADMIN — SODIUM CHLORIDE, SODIUM GLUCONATE, SODIUM ACETATE, POTASSIUM CHLORIDE, MAGNESIUM CHLORIDE, SODIUM PHOSPHATE, DIBASIC, AND POTASSIUM PHOSPHATE 60 ML/HR: .53; .5; .37; .037; .03; .012; .00082 INJECTION, SOLUTION INTRAVENOUS at 12:57

## 2022-08-12 RX ADMIN — ACETAMINOPHEN 650 MG: 325 TABLET ORAL at 11:17

## 2022-08-12 NOTE — PROGRESS NOTES
[address] [facility] NOTE   German Dong 78 y o  male MRN: [id number]  Unit/Bed#: -01 Encounter: [id number]  Reason for Consult: ISRA[place]    ASSESSMENT and PLAN:  [place]              Hx of CKD stage III, baseline Cr of 1 5 (7/15/22)  Does not follow with a nephrologist  Does not            have routine care with a PCP  CT chest/abd/pelvis: negative for hydronephrosis or cysts 5/2022              UA 5/2022: trace proteinuria   Repeat UA 8/11/22: no RBC or proteinuria               Post-operative Cr: 2 03, 500 mL bolus of isolyte; 60 mL/hr maintenance fluid  Cr today: 1 61 downtrending from 2 03 yesterday              Will need to establish care with nephrologist   Discontinue IVFs, okay for discharge from our standpoint   2) CKD              Hx of CKD stage III, baseline Cr of 1 5              UA: 5/2022 trace proteinuria    Repeat UA 8/11/22: no RBC or proteinuria              Will need to establish care with outpatient nephrology  3) Hypertension              No hx of HTN  //82  4) volume status              Euvolemic on exam  5) Electrolytes              K: 4, CO2: 25, Na: 140      SUBJECTIVE / 24H INTERVAL HISTORY:  Patient was seen and examined  POD 2 s/p bilateral hernia repair  C/o incisional tenderness upon standing  He is tolerating his diet well  No nausea, vomiting, fevers, or chills  No changes in urinary output  OBJECTIVE:  Current Weight: Weight - Scale: 83 5 kg (184 lb)  Vitals:    08/11/22 1524 08/11/22 1850 08/11/22 2351 08/12/22 0742   BP: 130/65 138/70 124/61 109/51   BP Location:       Pulse:   66 70   Resp:  18 18    Temp:  (!) 97 4 °F (36 3 °C) 97 6 °F (36 4 °C) 97 9 °F (36 6 °C)   TempSrc:       SpO2:   96% 98%   Weight:       Height:         Physical Exam  Constitutional:       General: He is not in acute distress  Appearance: He is not ill-appearing or toxic-appearing  HENT:      Head: Normocephalic and atraumatic        Mouth/Throat: Mouth: Mucous membranes are moist    Eyes:      General: No scleral icterus  Extraocular Movements: Extraocular movements intact  Conjunctiva/sclera: Conjunctivae normal    Cardiovascular:      Rate and Rhythm: Normal rate and regular rhythm  Pulses: Normal pulses  Heart sounds: Normal heart sounds  Pulmonary:      Effort: Pulmonary effort is normal    Abdominal:      General: Abdomen is flat  Bowel sounds are normal       Palpations: Abdomen is soft  Comments: Mild TTP around inguinal incisions    Musculoskeletal:      Right lower leg: No edema  Left lower leg: No edema  Skin:     General: Skin is warm and dry  Capillary Refill: Capillary refill takes less than 2 seconds  Neurological:      Mental Status: He is alert and oriented to person, place, and time     Psychiatric:         Mood and Affect: Mood normal          Behavior: Behavior normal            Intake/Output Summary (Last 24 hours) at 8/12/2022 0943  Last data filed at 8/12/2022 0601  Gross per 24 hour   Intake --   Output 1500 ml   Net -1500 ml       Medications:    Current Facility-Administered Medications:     acetaminophen (TYLENOL) tablet 650 mg, 650 mg, Oral, Q6H [address], Victoria Pizarro MD, 650 mg at 08/12/22 0455    calcium carbonate (TUMS) chewable tablet 500 mg, 500 mg, Oral, TID PRN, Victoria Pizarro MD    docusate sodium (COLACE) capsule 100 mg, 100 mg, Oral, BID, Victoria Pizarro MD, 100 mg at 08/12/22 0742    heparin (porcine) subcutaneous injection 5,000 Units, 5,000 Units, Subcutaneous, Q8H [address], 5,000 Units at 08/12/22 0457 **AND** [COMPLETED] Platelet count, , , Once, Victoria Pizarro MD    HYDROmorphone (DILAUDID) injection 0 5 mg, 0 5 mg, Intravenous, Q3H PRN, Victoria Pizarro MD    labetalol (NORMODYNE) injection 10 mg, 10 mg, Intravenous, Q6H PRN, Victoria Pizarro MD    melatonin tablet 3 mg, 3 mg, Oral, HS PRN, Victoria Pizarro MD    methocarbamol (ROBAXIN) tablet 500 mg, 500 mg, Oral, Q6H [facility], Wolf Herrera MD, 500 mg at 08/12/22 0456    multi-electrolyte (PLASMALYTE-A/ISOLYTE-S PH 7 4) IV solution, 60 mL/hr, Intravenous, Continuous, Donna Larkin Garner [facility], Last Rate: 60 mL/hr at 08/11/22 2153, 60 mL/hr at 08/11/22 2153    ondansetron (ZOFRAN) injection 4 mg, 4 mg, Intravenous, Q6H PRN, Wolf Herrera MD    oxyCODONE (ROXICODONE) IR tablet 2 5 mg, 2 5 mg, Oral, Q4H PRN, Wolf Herrera MD    oxyCODONE (ROXICODONE) IR tablet 5 mg, 5 mg, Oral, Q4H PRN, Wolf Herrera MD    Laboratory Results:  Results from last 7 days   Lab Units 08/12/22  0601 08/11/22  1019   WBC Thousand/uL 11 48*  --    HEMOGLOBIN g/dL 11 9*  --    HEMATOCRIT % 37 0  --    PLATELETS Thousands/uL 158 181   POTASSIUM mmol/L 4 0 4 8   CHLORIDE mmol/L 110* 111*   CO2 mmol/L 25 25   BUN mg/dL 26* 30*   CREATININE mg/dL 1 61* 2 03*   CALCIUM mg/dL 8 3 8 7     Velasquez Junior DO  PGY-1

## 2022-08-12 NOTE — PROGRESS NOTES
Progress Note - General Surgery  : [facility] Resident on Mehran Almanzar 78 y o  male MRN: [id number]  Unit/Bed#: MS Lewis01 Encounter: [id number]      Assessment:  78 y o  male POD 2 s/p BL open IHR    Remains admitted due to ISRAEL      Plan:  Regular diet  Encourage ambulation, OOB  Appreciate nephrology recs   Isolyte 60   UA   PVR  F/u AM creatinine  SQH      Subjective: Complains of mild soreness BL groins, feels well, tolerating diet      Objective:     Physical Exam:  GEN: NAD   Ab: Soft, NT/ND, BL groin dressings CDi  Lung: Normal effort on RA  CV: RRR   Extrem: No CCE   Neuro: A+Ox3       I/O       08/10 0701 08/11 0700 08/11 0701 08/12 0700    IV Piggyback 750     Total Intake(mL/kg) 750 (9)     Urine (mL/kg/hr) 600 400 (0 3)    Total Output 600 400    Net +150 -400                Lab, Imaging and other studies: I have personally reviewed pertinent reports    , CBC with diff:   Lab Results   Component Value Date     08/11/2022    MPV 9 8 08/11/2022   , BMP/CMP:   Lab Results   Component Value Date    SODIUM 139 08/11/2022    K 4 8 08/11/2022     (H) 08/11/2022    CO2 25 08/11/2022    BUN 30 (H) 08/11/2022    CREATININE 2 03 (H) 08/11/2022    CALCIUM 8 7 08/11/2022    EGFR 31 08/11/2022         VTE Pharmacologic Prophylaxis: Heparin        Gerald Ruvalcaba MD  8/11/2022 9:43 PM

## 2022-08-12 NOTE — DISCHARGE SUMMARY
Discharge Summary - Yamilet Hughes 78 y o  male MRN: [id number]    Unit/Bed#: -01 Encounter: [id number]    Admission Date:     Admitting Diagnosis: Non-recurrent bilateral inguinal hernia without obstruction or gangrene [K40 20]    HPI: per Saulo Ceavllos:  "Yamilet Hughes is a 78 y o  male who had an elective b/l  Inguinal hernia repair yesterday 8/10/22  A renal consultation is requested today for assistance in the management of high-risk for ISRAEL  He has a PMHx of HLD, pre-diabetes (A1C 5 6 5/2022), and CKD III  He does not routinely follow with his PCP Dr Yvonne Méndez  Per chart review, most recent labs include a pre-operative CMP on 7/15/22 with a Cr of 1 57 and 4/26/22 with a Cr of 1 53  His documented home medications include atorvastatin 10 mg and miralax  He has recently self discontinued his atorvastatin  He does not take any supplements or vitamins  No NSAID use  No recent imaging with contrast  No family hx of CKD  He was a former smoker, 25 pack year, quit in 2000  He drinks 1, 12 ounce beer nightly  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       Pt states that his inguinal hernias have been present for >1 year and would cause post-prandial pain  Since his surgery yesterday, he is without any abdominal pain  He denies any changes in urinary output, stream, frequency, or urgency  He denies any current nausea or vomiting  Notes he has chronic loose stools  He denies any chest pain, SOB, or recent illnesses"    Procedures Performed: No orders of the defined types were placed in this encounter  Summary of Hospital Course: 79 y/o male admitted for an elective Inguinal hernia repair on 8/10/22  Sen by nephrology and surgery, doing well  Abelt to be discharged home today, Nephrology requesting a follow up BMP in 1 week, and they will set up outpatient follow up with him  Surgery will see in 2 weeks for follow up in outpatient office    Recommend f/u with PCP to discuss events of recent hospitalization  Significant Findings, Care, Treatment and Services Provided: No results found  Complications: none    Discharge Diagnosis: B/L Inguinal hernia    Medical Problems             Resolved Problems  Date Reviewed: 8/10/2022   None                 Condition at Discharge: stable         Discharge instructions/Information to patient and family:   See after visit summary for information provided to patient and family  Provisions for Follow-Up Care:  See after visit summary for information related to follow-up care and any pertinent home health orders  PCP: Rolando Santos DO    Disposition: Home    Planned Readmission: No      Discharge Statement   I spent 30 minutes discharging the patient  This time was spent on the day of discharge  I had direct contact with the patient on the day of discharge  Additional documentation is required if more than 30 minutes were spent on discharge  Discharge Medications:  See after visit summary for reconciled discharge medications provided to patient and family

## 2022-08-15 ENCOUNTER — OFFICE VISIT (OUTPATIENT)
Dept: SURGERY | Facility: CLINIC | Age: 77
End: 2022-08-15

## 2022-08-15 VITALS
DIASTOLIC BLOOD PRESSURE: 64 MMHG | HEIGHT: 72 IN | SYSTOLIC BLOOD PRESSURE: 120 MMHG | OXYGEN SATURATION: 98 % | WEIGHT: 183 LBS | TEMPERATURE: 97.8 F | HEART RATE: 73 BPM | BODY MASS INDEX: 24.79 KG/M2

## 2022-08-15 DIAGNOSIS — K40.20 NON-RECURRENT BILATERAL INGUINAL HERNIA WITHOUT OBSTRUCTION OR GANGRENE: Primary | ICD-10-CM

## 2022-08-15 PROCEDURE — 99024 POSTOP FOLLOW-UP VISIT: CPT | Performed by: SURGERY

## 2022-08-15 RX ORDER — DOCUSATE SODIUM 100 MG/1
100 CAPSULE, LIQUID FILLED ORAL DAILY
COMMUNITY

## 2022-08-15 NOTE — ASSESSMENT & PLAN NOTE
76M now s/p b/l Inguinal Hernia repair on 8/10/22, here for post operative check  Healing ridge in place, scrotal edema  Plan:  Follow up next week for staple removal    No heavy lifting, instructions clarified with brother who was present and is a family practice physician

## 2022-08-15 NOTE — PROGRESS NOTES
Assessment/Plan:    Non-recurrent bilateral inguinal hernia without obstruction or gangrene  76M now s/p b/l Inguinal Hernia repair on 8/10/22, here for post operative check  Healing ridge in place, scrotal edema  Plan:  Follow up next week for staple removal    No heavy lifting, instructions clarified with brother who was present and is a family practice physician  Diagnoses and all orders for this visit:    Non-recurrent bilateral inguinal hernia without obstruction or gangrene    Other orders  -     docusate sodium (COLACE) 100 mg capsule; Take 100 mg by mouth in the morning        Subjective:      Patient ID: Dominic Cason is a 78 y o  male  1 week post operative from bilateral inguinal hernia repair  No complaints of bowel dysfunction, urinating appropriately  Doing well over all  The following portions of the patient's history were reviewed and updated as appropriate: allergies, current medications, past family history, past medical history, past social history, past surgical history and problem list     Review of Systems   All other systems reviewed and are negative  Objective:      /64 (BP Location: Left arm, Patient Position: Sitting, Cuff Size: Adult)   Pulse 73   Temp 97 8 °F (36 6 °C) (Temporal)   Ht 6' (1 829 m)   Wt 83 kg (183 lb)   SpO2 98%   BMI 24 82 kg/m²          Physical Exam  HENT:      Head: Normocephalic and atraumatic  Nose: Nose normal       Mouth/Throat:      Mouth: Mucous membranes are dry  Eyes:      General: No scleral icterus  Abdominal:      General: There is no distension  Palpations: Abdomen is soft  Tenderness: There is no abdominal tenderness  Genitourinary:     Comments: B/l inguinal incisions c/d/i with staples    Ecchymosis in scrotum  Musculoskeletal:         General: Normal range of motion  Skin:     General: Skin is warm  Neurological:      Mental Status: He is alert  Mental status is at baseline     Psychiatric: Mood and Affect: Mood normal

## 2022-08-22 ENCOUNTER — APPOINTMENT (OUTPATIENT)
Dept: LAB | Facility: CLINIC | Age: 77
End: 2022-08-22
Payer: COMMERCIAL

## 2022-08-22 ENCOUNTER — OFFICE VISIT (OUTPATIENT)
Dept: SURGERY | Facility: CLINIC | Age: 77
End: 2022-08-22

## 2022-08-22 VITALS — WEIGHT: 181.6 LBS | BODY MASS INDEX: 24.6 KG/M2 | HEIGHT: 72 IN | TEMPERATURE: 98.3 F

## 2022-08-22 DIAGNOSIS — Z87.19 H/O BILATERAL INGUINAL HERNIA REPAIR: Primary | ICD-10-CM

## 2022-08-22 DIAGNOSIS — Z98.890 H/O BILATERAL INGUINAL HERNIA REPAIR: Primary | ICD-10-CM

## 2022-08-22 DIAGNOSIS — K40.20 NON-RECURRENT BILATERAL INGUINAL HERNIA WITHOUT OBSTRUCTION OR GANGRENE: ICD-10-CM

## 2022-08-22 LAB
ANION GAP SERPL CALCULATED.3IONS-SCNC: 4 MMOL/L (ref 4–13)
BUN SERPL-MCNC: 22 MG/DL (ref 5–25)
CALCIUM SERPL-MCNC: 9.3 MG/DL (ref 8.3–10.1)
CHLORIDE SERPL-SCNC: 111 MMOL/L (ref 96–108)
CO2 SERPL-SCNC: 25 MMOL/L (ref 21–32)
CREAT SERPL-MCNC: 1.56 MG/DL (ref 0.6–1.3)
GFR SERPL CREATININE-BSD FRML MDRD: 43 ML/MIN/1.73SQ M
GLUCOSE P FAST SERPL-MCNC: 113 MG/DL (ref 65–99)
POTASSIUM SERPL-SCNC: 5 MMOL/L (ref 3.5–5.3)
SODIUM SERPL-SCNC: 140 MMOL/L (ref 135–147)

## 2022-08-22 PROCEDURE — 36415 COLL VENOUS BLD VENIPUNCTURE: CPT

## 2022-08-22 PROCEDURE — 99024 POSTOP FOLLOW-UP VISIT: CPT | Performed by: SURGERY

## 2022-08-22 PROCEDURE — 80048 BASIC METABOLIC PNL TOTAL CA: CPT

## 2022-08-22 NOTE — ASSESSMENT & PLAN NOTE
77 y/o M w b/l open inguinal hernia repair on 8/10  Doing well  Vss  Afebrile  B/l groin sites c/d/i  Staples removed and steristrips placed over incisions today  Plan:   Follow up with surgery prn  No heavy lifting for another 4 weeks  Let the steri strips fall off on their own

## 2022-08-22 NOTE — PROGRESS NOTES
Assessment/Plan:    H/O bilateral inguinal hernia repair  79 y/o M w b/l open inguinal hernia repair on 8/10  Doing well  Vss  Afebrile  B/l groin sites c/d/i  Staples removed and steristrips placed over incisions today  Plan:   Follow up with surgery prn  No heavy lifting for another 4 weeks  Let the steri strips fall off on their own  Diagnoses and all orders for this visit:    H/O bilateral inguinal hernia repair          Subjective:      Patient ID: Siomara Jimenez is a 78 y o  male  Feels great  Without complaints  Denied drainage of redness to his incision sites  He reports improvement in b/l groin swelling since surgery and he feels much improved prior to surgery  Denied any recurrrence of hernia b/l  Denied any groin pain  Denied any groin, leg or testicle numbness  Denied any urinary complaints  He feels great and would like to follow up as needed  The following portions of the patient's history were reviewed and updated as appropriate: allergies, current medications, past family history, past medical history, past social history, past surgical history and problem list     Review of Systems   Constitutional: Negative for chills and fever  HENT: Negative  Respiratory: Negative  Cardiovascular: Negative  Gastrointestinal: Negative  Endocrine: Negative  Genitourinary: Negative  Musculoskeletal: Negative  Skin: Negative  Allergic/Immunologic: Negative  Neurological: Negative  Hematological: Negative  Psychiatric/Behavioral: Negative  Objective:      Temp 98 3 °F (36 8 °C) (Core)   Ht 6' (1 829 m)   Wt 82 4 kg (181 lb 9 6 oz)   BMI 24 63 kg/m²          Physical Exam  Vitals reviewed  Constitutional:       Appearance: Normal appearance  HENT:      Head: Normocephalic and atraumatic  Nose: Nose normal       Mouth/Throat:      Mouth: Mucous membranes are moist    Eyes:      Pupils: Pupils are equal, round, and reactive to light  Cardiovascular:      Rate and Rhythm: Normal rate  Pulses: Normal pulses  Pulmonary:      Effort: Pulmonary effort is normal    Abdominal:      General: There is no distension  Palpations: Abdomen is soft  Tenderness: There is no abdominal tenderness  There is no guarding  Comments: Mild swelling of b/l groins  Groin incision c/d/i no erythema  No drainage  Staples removed today 8/22 and steristrips placed  Genitourinary:     Testes: Normal       Comments: deferred  Musculoskeletal:         General: Normal range of motion  Cervical back: Normal range of motion and neck supple  Skin:     General: Skin is warm  Capillary Refill: Capillary refill takes 2 to 3 seconds  Neurological:      General: No focal deficit present  Mental Status: He is alert and oriented to person, place, and time     Psychiatric:         Mood and Affect: Mood normal

## 2023-02-02 NOTE — ANESTHESIA PREPROCEDURE EVALUATION
Procedure:  REPAIR HERNIA INGUINAL (Bilateral Groin)    Relevant Problems   ANESTHESIA (within normal limits)      CARDIO   (-) HTN (hypertension)      ENDO   (-) Diabetes mellitus, type 2 (HCC)   (-) Hyperthyroidism   (-) Hypothyroidism      GI/HEPATIC   (-) Gastroesophageal reflux disease      /RENAL   (+) Chronic kidney disease      HEMATOLOGY (within normal limits)      MUSCULOSKELETAL (within normal limits)      NEURO/PSYCH   (+) Anxiety      PULMONARY   (-) Asthma   (-) Sleep apnea        Physical Exam    Airway    Mallampati score: III  TM Distance: >3 FB  Neck ROM: limited     Dental   No notable dental hx     Cardiovascular      Pulmonary      Other Findings        Anesthesia Plan  ASA Score- 2     Anesthesia Type- general with ASA Monitors  Additional Monitors:   Airway Plan: ETT  Comment: Will plan for TIVA as outlined in Pre-op note from 8/2/2022  Arley Murillo Plan Factors-Exercise tolerance (METS): >4 METS  Chart reviewed  EKG reviewed  Existing labs reviewed  Patient summary reviewed  Patient is not a current smoker  Induction- intravenous  Postoperative Plan- Plan for postoperative opioid use  Informed Consent- Anesthetic plan and risks discussed with patient Yassine Howe, Nurse Advocate)  I personally reviewed this patient with the CRNA  Discussed and agreed on the Anesthesia Plan with the TATUM Muirllo
Procedure:  REPAIR HERNIA INGUINAL (Bilateral Groin)    Relevant Problems   CARDIO   (+) Hyperlipidemia             Anesthesia Plan  ASA Score-     Anesthesia Type-         Additional Monitors:   Airway Plan:     Comment: Long d/w Angela, nurse advocate  Will bring in 2 hours early for hydration  Rec, prop drip for POCD  Short actin narcs         Plan Factors-    Induction-     Postoperative Plan-     Informed Consent-
Warm/Dry

## 2023-07-05 ENCOUNTER — APPOINTMENT (EMERGENCY)
Dept: RADIOLOGY | Facility: HOSPITAL | Age: 78
DRG: 312 | End: 2023-07-05
Payer: COMMERCIAL

## 2023-07-05 ENCOUNTER — HOSPITAL ENCOUNTER (INPATIENT)
Facility: HOSPITAL | Age: 78
LOS: 2 days | Discharge: HOME | DRG: 312 | End: 2023-07-08
Attending: EMERGENCY MEDICINE | Admitting: HOSPITALIST
Payer: COMMERCIAL

## 2023-07-05 DIAGNOSIS — I95.1 ORTHOSTATIC HYPOTENSION: ICD-10-CM

## 2023-07-05 DIAGNOSIS — R42 DIZZINESS AND GIDDINESS: ICD-10-CM

## 2023-07-05 DIAGNOSIS — R42 LIGHTHEADEDNESS: Primary | ICD-10-CM

## 2023-07-05 DIAGNOSIS — R55 SYNCOPE: ICD-10-CM

## 2023-07-05 DIAGNOSIS — E86.0 DEHYDRATION: ICD-10-CM

## 2023-07-05 LAB
2HR DELTA HS TROPONIN: 1 NG/L
4HR DELTA HS TROPONIN: 1 NG/L
ALBUMIN SERPL BCP-MCNC: 3.8 G/DL (ref 3.5–5)
ALP SERPL-CCNC: 56 U/L (ref 34–104)
ALT SERPL W P-5'-P-CCNC: 23 U/L (ref 7–52)
ANION GAP SERPL CALCULATED.3IONS-SCNC: 6 MMOL/L
AST SERPL W P-5'-P-CCNC: 25 U/L (ref 13–39)
BACTERIA UR QL AUTO: ABNORMAL /HPF
BASOPHILS # BLD AUTO: 0.03 THOUSANDS/ÂΜL (ref 0–0.1)
BASOPHILS NFR BLD AUTO: 1 % (ref 0–1)
BILIRUB SERPL-MCNC: 0.54 MG/DL (ref 0.2–1)
BILIRUB UR QL STRIP: NEGATIVE
BUN SERPL-MCNC: 28 MG/DL (ref 5–25)
CALCIUM SERPL-MCNC: 8.7 MG/DL (ref 8.4–10.2)
CARDIAC TROPONIN I PNL SERPL HS: 6 NG/L
CARDIAC TROPONIN I PNL SERPL HS: 7 NG/L
CARDIAC TROPONIN I PNL SERPL HS: 7 NG/L
CHLORIDE SERPL-SCNC: 111 MMOL/L (ref 96–108)
CLARITY UR: CLEAR
CO2 SERPL-SCNC: 22 MMOL/L (ref 21–32)
COLOR UR: YELLOW
CREAT SERPL-MCNC: 1.73 MG/DL (ref 0.6–1.3)
EOSINOPHIL # BLD AUTO: 0.02 THOUSAND/ÂΜL (ref 0–0.61)
EOSINOPHIL NFR BLD AUTO: 0 % (ref 0–6)
ERYTHROCYTE [DISTWIDTH] IN BLOOD BY AUTOMATED COUNT: 12.3 % (ref 11.6–15.1)
GFR SERPL CREATININE-BSD FRML MDRD: 37 ML/MIN/1.73SQ M
GLUCOSE SERPL-MCNC: 172 MG/DL (ref 65–140)
GLUCOSE UR STRIP-MCNC: NEGATIVE MG/DL
HCT VFR BLD AUTO: 34.7 % (ref 36.5–49.3)
HGB BLD-MCNC: 11.3 G/DL (ref 12–17)
HGB UR QL STRIP.AUTO: NEGATIVE
HYALINE CASTS #/AREA URNS LPF: ABNORMAL /LPF
IMM GRANULOCYTES # BLD AUTO: 0.02 THOUSAND/UL (ref 0–0.2)
IMM GRANULOCYTES NFR BLD AUTO: 0 % (ref 0–2)
KETONES UR STRIP-MCNC: NEGATIVE MG/DL
LEUKOCYTE ESTERASE UR QL STRIP: NEGATIVE
LYMPHOCYTES # BLD AUTO: 0.93 THOUSANDS/ÂΜL (ref 0.6–4.47)
LYMPHOCYTES NFR BLD AUTO: 14 % (ref 14–44)
MCH RBC QN AUTO: 32.8 PG (ref 26.8–34.3)
MCHC RBC AUTO-ENTMCNC: 32.6 G/DL (ref 31.4–37.4)
MCV RBC AUTO: 101 FL (ref 82–98)
MONOCYTES # BLD AUTO: 0.46 THOUSAND/ÂΜL (ref 0.17–1.22)
MONOCYTES NFR BLD AUTO: 7 % (ref 4–12)
NEUTROPHILS # BLD AUTO: 5.01 THOUSANDS/ÂΜL (ref 1.85–7.62)
NEUTS SEG NFR BLD AUTO: 78 % (ref 43–75)
NITRITE UR QL STRIP: NEGATIVE
NON-SQ EPI CELLS URNS QL MICRO: ABNORMAL /HPF
NRBC BLD AUTO-RTO: 0 /100 WBCS
PH UR STRIP.AUTO: 5 [PH]
PLATELET # BLD AUTO: 140 THOUSANDS/UL (ref 149–390)
PMV BLD AUTO: 11.4 FL (ref 8.9–12.7)
POTASSIUM SERPL-SCNC: 4.6 MMOL/L (ref 3.5–5.3)
PROT SERPL-MCNC: 6.1 G/DL (ref 6.4–8.4)
PROT UR STRIP-MCNC: ABNORMAL MG/DL
RBC # BLD AUTO: 3.45 MILLION/UL (ref 3.88–5.62)
RBC #/AREA URNS AUTO: ABNORMAL /HPF
SODIUM SERPL-SCNC: 139 MMOL/L (ref 135–147)
SP GR UR STRIP.AUTO: 1.02 (ref 1–1.03)
UROBILINOGEN UR STRIP-ACNC: <2 MG/DL
WBC # BLD AUTO: 6.47 THOUSAND/UL (ref 4.31–10.16)
WBC #/AREA URNS AUTO: ABNORMAL /HPF

## 2023-07-05 PROCEDURE — 99222 1ST HOSP IP/OBS MODERATE 55: CPT | Performed by: HOSPITALIST

## 2023-07-05 PROCEDURE — 80053 COMPREHEN METABOLIC PANEL: CPT | Performed by: EMERGENCY MEDICINE

## 2023-07-05 PROCEDURE — 85025 COMPLETE CBC W/AUTO DIFF WBC: CPT | Performed by: EMERGENCY MEDICINE

## 2023-07-05 PROCEDURE — 93005 ELECTROCARDIOGRAM TRACING: CPT

## 2023-07-05 PROCEDURE — 99285 EMERGENCY DEPT VISIT HI MDM: CPT | Performed by: PHYSICIAN ASSISTANT

## 2023-07-05 PROCEDURE — 36415 COLL VENOUS BLD VENIPUNCTURE: CPT | Performed by: EMERGENCY MEDICINE

## 2023-07-05 PROCEDURE — 96361 HYDRATE IV INFUSION ADD-ON: CPT

## 2023-07-05 PROCEDURE — 99284 EMERGENCY DEPT VISIT MOD MDM: CPT

## 2023-07-05 PROCEDURE — 81001 URINALYSIS AUTO W/SCOPE: CPT | Performed by: PHYSICIAN ASSISTANT

## 2023-07-05 PROCEDURE — 71045 X-RAY EXAM CHEST 1 VIEW: CPT

## 2023-07-05 PROCEDURE — 96360 HYDRATION IV INFUSION INIT: CPT

## 2023-07-05 PROCEDURE — 84484 ASSAY OF TROPONIN QUANT: CPT | Performed by: EMERGENCY MEDICINE

## 2023-07-05 RX ORDER — ACETAMINOPHEN 325 MG/1
650 TABLET ORAL EVERY 6 HOURS PRN
Status: CANCELLED | OUTPATIENT
Start: 2023-07-05

## 2023-07-05 RX ORDER — SODIUM CHLORIDE 9 MG/ML
150 INJECTION, SOLUTION INTRAVENOUS CONTINUOUS
Status: DISCONTINUED | OUTPATIENT
Start: 2023-07-05 | End: 2023-07-07

## 2023-07-05 RX ORDER — HEPARIN SODIUM 5000 [USP'U]/ML
5000 INJECTION, SOLUTION INTRAVENOUS; SUBCUTANEOUS EVERY 8 HOURS SCHEDULED
Status: DISCONTINUED | OUTPATIENT
Start: 2023-07-05 | End: 2023-07-08 | Stop reason: HOSPADM

## 2023-07-05 RX ORDER — SERTRALINE HYDROCHLORIDE 25 MG/1
25 TABLET, FILM COATED ORAL
COMMUNITY

## 2023-07-05 RX ORDER — DOCUSATE SODIUM 100 MG/1
100 CAPSULE, LIQUID FILLED ORAL 2 TIMES DAILY
Status: DISCONTINUED | OUTPATIENT
Start: 2023-07-05 | End: 2023-07-08 | Stop reason: HOSPADM

## 2023-07-05 RX ORDER — ACETAMINOPHEN 325 MG/1
975 TABLET ORAL EVERY 8 HOURS PRN
Status: DISCONTINUED | OUTPATIENT
Start: 2023-07-05 | End: 2023-07-08 | Stop reason: HOSPADM

## 2023-07-05 RX ORDER — POLYETHYLENE GLYCOL 3350 17 G/17G
17 POWDER, FOR SOLUTION ORAL DAILY
Status: DISCONTINUED | OUTPATIENT
Start: 2023-07-06 | End: 2023-07-08 | Stop reason: HOSPADM

## 2023-07-05 RX ORDER — ATORVASTATIN CALCIUM 10 MG/1
10 TABLET, FILM COATED ORAL DAILY
COMMUNITY

## 2023-07-05 RX ORDER — SENNOSIDES 8.6 MG
1 TABLET ORAL DAILY
Status: DISCONTINUED | OUTPATIENT
Start: 2023-07-06 | End: 2023-07-08 | Stop reason: HOSPADM

## 2023-07-05 RX ADMIN — SODIUM CHLORIDE 1000 ML: 0.9 INJECTION, SOLUTION INTRAVENOUS at 14:01

## 2023-07-05 RX ADMIN — HEPARIN SODIUM 5000 UNITS: 5000 INJECTION INTRAVENOUS; SUBCUTANEOUS at 21:57

## 2023-07-05 RX ADMIN — SODIUM CHLORIDE 150 ML/HR: 0.9 INJECTION, SOLUTION INTRAVENOUS at 16:27

## 2023-07-05 RX ADMIN — SODIUM CHLORIDE 150 ML/HR: 0.9 INJECTION, SOLUTION INTRAVENOUS at 21:57

## 2023-07-05 RX ADMIN — HEPARIN SODIUM 5000 UNITS: 5000 INJECTION INTRAVENOUS; SUBCUTANEOUS at 16:48

## 2023-07-05 NOTE — PLAN OF CARE
Problem: CARDIOVASCULAR - ADULT  Goal: Maintains optimal cardiac output and hemodynamic stability  Description: INTERVENTIONS:  - Monitor I/O, vital signs and rhythm  - Monitor for S/S and trends of decreased cardiac output  - Administer and titrate ordered vasoactive medications to optimize hemodynamic stability  - Assess quality of pulses, skin color and temperature  - Assess for signs of decreased coronary artery perfusion  - Instruct patient to report change in severity of symptoms  Outcome: Progressing     Problem: GASTROINTESTINAL - ADULT  Goal: Maintains adequate nutritional intake  Description: INTERVENTIONS:  - Monitor percentage of each meal consumed  - Identify factors contributing to decreased intake, treat as appropriate  - Assist with meals as needed  - Monitor I&O, weight, and lab values if indicated  - Obtain nutrition services referral as needed  Outcome: Progressing     Problem: METABOLIC, FLUID AND ELECTROLYTES - ADULT  Goal: Fluid balance maintained  Description: INTERVENTIONS:  - Monitor labs   - Monitor I/O and WT  - Instruct patient on fluid and nutrition as appropriate  - Assess for signs & symptoms of volume excess or deficit  Outcome: Progressing     Problem: SAFETY ADULT  Goal: Patient will remain free of falls  Description: INTERVENTIONS:  - Educate patient/family on patient safety including physical limitations  - Instruct patient to call for assistance with activity   - Consult OT/PT to assist with strengthening/mobility   - Keep Call bell within reach  - Keep bed low and locked with side rails adjusted as appropriate  - Keep care items and personal belongings within reach  - Initiate and maintain comfort rounds  - Make Fall Risk Sign visible to staff  - Offer Toileting every 2 Hours, in advance of need  - Initiate/Maintain bed/chair alarm  - Obtain necessary fall risk management equipment: bed/chair alarm, non-slip socks  - Apply yellow socks and bracelet for high fall risk patients  - Consider moving patient to room near nurses station  Outcome: Progressing  Goal: Maintain or return to baseline ADL function  Description: INTERVENTIONS:  -  Assess patient's ability to carry out ADLs; assess patient's baseline for ADL function and identify physical deficits which impact ability to perform ADLs (bathing, care of mouth/teeth, toileting, grooming, dressing, etc.)  - Assess/evaluate cause of self-care deficits   - Assess range of motion  - Assess patient's mobility; develop plan if impaired  - Assess patient's need for assistive devices and provide as appropriate  - Encourage maximum independence but intervene and supervise when necessary  - Involve family in performance of ADLs  - Assess for home care needs following discharge   - Consider OT consult to assist with ADL evaluation and planning for discharge  - Provide patient education as appropriate  Outcome: Progressing  Goal: Maintains/Returns to pre admission functional level  Description: INTERVENTIONS:  - Perform BMAT or MOVE assessment daily.   - Set and communicate daily mobility goal to care team and patient/family/caregiver. - Collaborate with rehabilitation services on mobility goals if consulted  - Perform Range of Motion 3 times a day. - Reposition patient every 2 hours.   - Dangle patient 3 times a day  - Stand patient 3 times a day  - Ambulate patient 3 times a day  - Out of bed to chair 3 times a day for meals  - Out of bed for toileting  - Record patient progress and toleration of activity level   Outcome: Progressing     Problem: DISCHARGE PLANNING  Goal: Discharge to home or other facility with appropriate resources  Description: INTERVENTIONS:  - Identify barriers to discharge w/patient and caregiver  - Arrange for needed discharge resources and transportation as appropriate  - Identify discharge learning needs (meds, wound care, etc.)  - Arrange for interpretive services to assist at discharge as needed  - Refer to Case Management Department for coordinating discharge planning if the patient needs post-hospital services based on physician/advanced practitioner order or complex needs related to functional status, cognitive ability, or social support system  Outcome: Progressing     Problem: Knowledge Deficit  Goal: Patient/family/caregiver demonstrates understanding of disease process, treatment plan, medications, and discharge instructions  Description: Complete learning assessment and assess knowledge base.   Interventions:  - Provide teaching at level of understanding  - Provide teaching via preferred learning methods  Outcome: Progressing

## 2023-07-05 NOTE — ED PROVIDER NOTES
History  Chief Complaint   Patient presents with   • Dizziness     Patient arrives via EMS from home. Reports two episodes of near syncope, each five minutes apart. Last episode was 1 hour ago. Patient still reporting mild dizziness. Did not fall or hit head, negative blood thinners. Patient is a 77 y/o M with h/o CKD, hyperlipidemia, that presents to the ED after a syncopal episode that occurred 3 hours ago. Patient states he was standing at the counter after eating a ham sandwich and felt lightheaded and passed out. He denies falling to the ground. He states he was leaning against the counter. Patient did not eat breakfast this morning. He denies chest pain or SOB. He states he still feels a little lightheaded. History provided by:  Patient  Dizziness  Quality:  Lightheadedness  Severity:  Moderate  Onset quality:  Gradual  Duration:  2 hours  Timing:  Constant  Progression:  Improving  Chronicity:  New  Relieved by: sitting down. Worsened by:  Standing up  Ineffective treatments:  None tried  Associated symptoms: syncope    Associated symptoms: no blood in stool, no chest pain, no diarrhea, no headaches, no nausea, no palpitations, no shortness of breath, no vision changes, no vomiting and no weakness    Risk factors: no anemia, no heart disease, no multiple medications and no new medications        Prior to Admission Medications   Prescriptions Last Dose Informant Patient Reported?  Taking?   acetaminophen (TYLENOL) 325 mg tablet Unknown  No No   Sig: Take 3 tablets (975 mg total) by mouth every 8 (eight) hours as needed for mild pain   atorvastatin (LIPITOR) 10 mg tablet   Yes Yes   Sig: Take 10 mg by mouth daily   docusate sodium (COLACE) 100 mg capsule Not Taking  Yes No   Sig: Take 100 mg by mouth in the morning   Patient not taking: Reported on 8/22/2022   methocarbamol (ROBAXIN) 500 mg tablet Not Taking  No No   Sig: Take 1 tablet (500 mg total) by mouth every 6 (six) hours   Patient not taking: Reported on 8/15/2022   polyethylene glycol (MIRALAX) 17 g packet Not Taking  Yes No   Sig: Take 17 g by mouth daily   Patient not taking: Reported on 2023   sertraline (ZOLOFT) 25 mg tablet 2023 Self Yes Yes   Sig: Take 25 mg by mouth 2 (two) times a day before lunch and dinner      Facility-Administered Medications: None       Past Medical History:   Diagnosis Date   • Anemia    • Anxiety    • Bilateral inguinal hernia 2022    Evaluated @ [facility]   • Bronchitis    • Chronic kidney disease     ckd 3   • Cognitive communication disorder    • Colitis    • Hyperlipidemia    • Pre-diabetes    • Trauma     mechanical fall down stairs       Past Surgical History:   Procedure Laterality Date   • CATARACT EXTRACTION     • COLONOSCOPY     • COLONOSCOPY W/ BIOPSIES  2001    Rain Silveira MD   • MOHS RECONSTRUCTION      basal ca   • HI RPR 1ST INGUN HRNA AGE 5 YRS/> REDUCIBLE Bilateral 8/10/2022    Procedure: REPAIR HERNIA INGUINAL;  Surgeon: Sarah Padilla MD;  Location: [facility];  Service: General       History reviewed. No pertinent family history. I have reviewed and agree with the history as documented. E-Cigarette/Vaping   • E-Cigarette Use Never User      E-Cigarette/Vaping Substances   • Nicotine No    • THC No    • CBD No    • Flavoring No    • Other No    • Unknown No      Social History     Tobacco Use   • Smoking status: Former     Years: 21.00     Types: Cigarettes     Quit date:      Years since quittin.5   • Smokeless tobacco: Never   Vaping Use   • Vaping Use: Never used   Substance Use Topics   • Alcohol use: Yes     Comment: rare   • Drug use: Not Currently       Review of Systems   Constitutional: Negative for chills and fever. HENT: Negative. Respiratory: Negative for cough and shortness of breath. Cardiovascular: Positive for syncope. Negative for chest pain and palpitations.    Gastrointestinal: Negative for blood in stool, diarrhea, nausea and vomiting. Genitourinary: Negative for dysuria. Musculoskeletal: Negative for back pain and neck pain. Skin: Negative for color change, pallor and rash. Neurological: Positive for dizziness, syncope and light-headedness. Negative for seizures, facial asymmetry, speech difficulty, weakness, numbness and headaches. Psychiatric/Behavioral: Negative for confusion. All other systems reviewed and are negative. Physical Exam  Physical Exam  Vitals and nursing note reviewed. Constitutional:       General: He is not in acute distress. Appearance: Normal appearance. He is well-developed, well-groomed and normal weight. He is not ill-appearing or diaphoretic. HENT:      Head: Normocephalic and atraumatic. Right Ear: External ear normal.      Left Ear: External ear normal.      Nose: Nose normal.   Eyes:      Conjunctiva/sclera: Conjunctivae normal.      Pupils: Pupils are equal.   Cardiovascular:      Rate and Rhythm: Normal rate and regular rhythm. Heart sounds: Normal heart sounds. Pulmonary:      Effort: Pulmonary effort is normal.      Breath sounds: Normal breath sounds. No wheezing, rhonchi or rales. Abdominal:      General: Abdomen is flat. Bowel sounds are normal.      Tenderness: There is no abdominal tenderness. Musculoskeletal:         General: No tenderness. Normal range of motion. Cervical back: Normal range of motion. Right lower leg: No edema. Left lower leg: No edema. Skin:     General: Skin is warm and dry. Coloration: Skin is not jaundiced or pale. Findings: No rash. Neurological:      General: No focal deficit present. Mental Status: He is alert and oriented to person, place, and time. Cranial Nerves: No cranial nerve deficit. Motor: No weakness. Psychiatric:         Mood and Affect: Mood normal.         Behavior: Behavior is cooperative.          Vital Signs  ED Triage Vitals [07/05/23 1305]   Temperature Pulse Respirations Blood Pressure SpO2   99.2 °F (37.3 °C) 69 20 99/56 97 %      Temp Source Heart Rate Source Patient Position - Orthostatic VS BP Location FiO2 (%)   Oral Monitor Lying Right arm --      Pain Score       No Pain           Vitals:    07/05/23 1539 07/05/23 1540 07/05/23 1543 07/05/23 1643   BP: 112/60 92/53 97/64 149/78   Pulse: 59 64 71 56   Patient Position - Orthostatic VS: Sitting - Orthostatic VS Standing - Orthostatic VS Standing - Orthostatic VS          Visual Acuity  Visual Acuity    Flowsheet Row Most Recent Value   L Pupil Size (mm) 3   R Pupil Size (mm) 3          ED Medications  Medications   sodium chloride 0.9 % infusion (150 mL/hr Intravenous New Bag 7/5/23 1627)   acetaminophen (TYLENOL) tablet 975 mg (has no administration in time range)   polyethylene glycol (MIRALAX) packet 17 g (has no administration in time range)   docusate sodium (COLACE) capsule 100 mg (100 mg Oral Not Given 7/5/23 1737)   senna (SENOKOT) tablet 8.6 mg (has no administration in time range)   heparin (porcine) subcutaneous injection 5,000 Units (5,000 Units Subcutaneous Given 7/5/23 1648)   sodium chloride 0.9 % bolus 1,000 mL (0 mL Intravenous Stopped 7/5/23 1627)       Diagnostic Studies  Results Reviewed     Procedure Component Value Units Date/Time    HS Troponin I 4hr [[id number]] Collected: 07/05/23 1809    Lab Status:  In process Specimen: Blood from Hand, Left Updated: 07/05/23 1812    Urine Microscopic [[id number]]  (Abnormal) Collected: 07/05/23 1601    Lab Status: Final result Specimen: Urine, Clean Catch Updated: 07/05/23 1706     RBC, UA None Seen /hpf      WBC, UA 0-1 /hpf      Epithelial Cells Occasional /hpf      Bacteria, UA Occasional /hpf      Hyaline Casts, UA 20-30 /lpf     HS Troponin I 2hr [[id number]]  (Normal) Collected: 07/05/23 1627    Lab Status: Final result Specimen: Blood from Arm, Right Updated: 07/05/23 1656     hs TnI 2hr 7 ng/L      Delta 2hr hsTnI 1 ng/L     Platelet count [[id number]]     Lab Status: No result Specimen: Blood     UA w Reflex to Microscopic w Reflex to Culture [[id number]]  (Abnormal) Collected: 07/05/23 1601    Lab Status: Final result Specimen: Urine, Clean Catch Updated: 07/05/23 1609     Color, UA Yellow     Clarity, UA Clear     Specific Gravity, UA 1.025     pH, UA 5.0     Leukocytes, UA Negative     Nitrite, UA Negative     Protein, UA Trace mg/dl      Glucose, UA Negative mg/dl      Ketones, UA Negative mg/dl      Urobilinogen, UA <2.0 mg/dl      Bilirubin, UA Negative     Occult Blood, UA Negative    Comprehensive metabolic panel [[id number]]  (Abnormal) Collected: 07/05/23 1340    Lab Status: Final result Specimen: Blood from Arm, Right Updated: 07/05/23 1503     Sodium 139 mmol/L      Potassium 4.6 mmol/L      Chloride 111 mmol/L      CO2 22 mmol/L      ANION GAP 6 mmol/L      BUN 28 mg/dL      Creatinine 1.73 mg/dL      Glucose 172 mg/dL      Calcium 8.7 mg/dL      AST 25 U/L      ALT 23 U/L      Alkaline Phosphatase 56 U/L      Total Protein 6.1 g/dL      Albumin 3.8 g/dL      Total Bilirubin 0.54 mg/dL      eGFR 37 ml/min/1.73sq m     Narrative:      [place] guidelines for Chronic Kidney Disease (CKD):   •  Stage 1 with normal or high GFR (GFR > 90 mL/min/1.73 square meters)  •  Stage 2 Mild CKD (GFR = 60-89 mL/min/1.73 square meters)  •  Stage 3A Moderate CKD (GFR = 45-59 mL/min/1.73 square meters)  •  Stage 3B Moderate CKD (GFR = 30-44 mL/min/1.73 square meters)  •  Stage 4 Severe CKD (GFR = 15-29 mL/min/1.73 square meters)  •  Stage 5 End Stage CKD (GFR <15 mL/min/1.73 square meters)  Note: GFR calculation is accurate only with a steady state creatinine    HS Troponin 0hr (reflex protocol) [[id number]]  (Normal) Collected: 07/05/23 1340    Lab Status: Final result Specimen: Blood from Arm, Right Updated: 07/05/23 1457     hs TnI 0hr 6 ng/L     CBC and differential [[id number]]  (Abnormal) Collected: 07/05/23 1340    Lab Status: Final result Specimen: Blood from Arm, Right Updated: 07/05/23 1424     WBC 6.47 Thousand/uL      RBC 3.45 Million/uL      Hemoglobin 11.3 g/dL      Hematocrit 34.7 %       fL      MCH 32.8 pg      MCHC 32.6 g/dL      RDW 12.3 %      MPV 11.4 fL      Platelets 511 Thousands/uL      nRBC 0 /100 WBCs      Neutrophils Relative 78 %      Immat GRANS % 0 %      Lymphocytes Relative 14 %      Monocytes Relative 7 %      Eosinophils Relative 0 %      Basophils Relative 1 %      Neutrophils Absolute 5.01 Thousands/µL      Immature Grans Absolute 0.02 Thousand/uL      Lymphocytes Absolute 0.93 Thousands/µL      Monocytes Absolute 0.46 Thousand/µL      Eosinophils Absolute 0.02 Thousand/µL      Basophils Absolute 0.03 Thousands/µL                  XR chest 1 view portable   ED Interpretation by Kj Mena PA-C (07/05 1431)   No acute abnormalities. Final Result by Jamir Hung MD (07/05 1435)      No acute cardiopulmonary disease. Workstation performed: [id number]                    Procedures  ECG 12 Lead Documentation Only    Date/Time: 7/5/2023 2:04 PM    Performed by: Kj Mena PA-C  Authorized by: Kj Mena PA-C    Indications / Diagnosis:  Lightheaded  ECG reviewed by me, the ED Provider: yes    Patient location:  ED  Rate:     ECG rate:  62  Rhythm:     Rhythm: sinus rhythm    Conduction:     Conduction: normal    ST segments:     ST segments:  Normal  T waves:     T waves: normal               ED Course  ED Course as of 07/05/23 1830   Wed Jul 05, 2023   1521 Patient states he still feels lightheaded.               HEART Risk Score    Flowsheet Row Most Recent Value   Heart Score Risk Calculator    History 0 Filed at: 07/05/2023 1829   ECG 0 Filed at: 07/05/2023 1829   Age 2 Filed at: 07/05/2023 1829   Risk Factors 1 Filed at: 07/05/2023 1829   Troponin 0 Filed at: 07/05/2023 1829   HEART Score 3 Filed at: 07/05/2023 1829                        SBIRT 20yo+ Flowsheet Row Most Recent Value   Initial Alcohol Screen: US AUDIT-C     1. How often do you have a drink containing alcohol? 0 Filed at: 07/05/2023 1308   2. How many drinks containing alcohol do you have on a typical day you are drinking? 0 Filed at: 07/05/2023 1308   3b. FEMALE Any Age, or MALE 65+: How often do you have 4 or more drinks on one occassion? 0 Filed at: 07/05/2023 1308   Audit-C Score 0 Filed at: 07/05/2023 1308   WILFREDO: How many times in the past year have you. .. Used an illegal drug or used a prescription medication for non-medical reasons? Never Filed at: 07/05/2023 1308                    Medical Decision Making  Patient with lightheadedness, near syncope, will order labs, EKG, CXR to rule out CAD, arrhythmia, electrolyte abnormality, dehydration, anemia. Patient with orthostatic hypotension, will give IV fluids and reassess. Patient with mild improvement with IV fluids, will admit for further cardiac monitoring and IV fluids. Dehydration: acute illness or injury  Lightheadedness: acute illness or injury  Orthostatic hypotension: acute illness or injury  Syncope: acute illness or injury  Amount and/or Complexity of Data Reviewed  Labs: ordered. Radiology: ordered and independent interpretation performed. ECG/medicine tests: ordered and independent interpretation performed. Risk  Prescription drug management. Decision regarding hospitalization.           Disposition  Final diagnoses:   Lightheadedness   Syncope   Dehydration   Orthostatic hypotension     Time reflects when diagnosis was documented in both MDM as applicable and the Disposition within this note     Time User Action Codes Description Comment    7/5/2023  3:50 PM Cyndra Gnosticism Add [R42] [address] [place]     7/5/2023  3:50 PM Cyndra Gnosticism Add [R55] Syncope     7/5/2023  3:50 PM Cyndra Gnosticism Add [E86.0] Dehydration     7/5/2023  3:51 PM Cyndra Gnosticism Add [I95.1] Orthostatic hypotension ED Disposition     ED Disposition   Admit    Condition   Stable    Date/Time   Wed Jul 5, 2023  3:50 PM    Comment   Case was discussed with Dr. Willis Mancia and the patient's admission status was agreed to be Admission Status: observation status to the service of Dr. Willis Mancia . Follow-up Information    None         Current Discharge Medication List      CONTINUE these medications which have NOT CHANGED    Details   atorvastatin (LIPITOR) 10 mg tablet Take 10 mg by mouth daily      sertraline (ZOLOFT) 25 mg tablet Take 25 mg by mouth 2 (two) times a day before lunch and dinner      acetaminophen (TYLENOL) 325 mg tablet Take 3 tablets (975 mg total) by mouth every 8 (eight) hours as needed for mild pain  Refills: 0    Associated Diagnoses: Non-recurrent bilateral inguinal hernia without obstruction or gangrene      docusate sodium (COLACE) 100 mg capsule Take 100 mg by mouth in the morning      methocarbamol (ROBAXIN) 500 mg tablet Take 1 tablet (500 mg total) by mouth every 6 (six) hours  Qty: 20 tablet, Refills: 0    Associated Diagnoses: Non-recurrent bilateral inguinal hernia without obstruction or gangrene      polyethylene glycol (MIRALAX) 17 g packet Take 17 g by mouth daily             No discharge procedures on file.     PDMP Review     None          ED Provider  Electronically Signed by           Chuyita Goyal PA-C  07/05/23 2942

## 2023-07-05 NOTE — ED NOTES
Maira Knutson is his Healthcare Proxy  She lives in [facility]  Please call with any questions    JOSSIE Wisdom  07/05/23 9668

## 2023-07-05 NOTE — ASSESSMENT & PLAN NOTE
Lab Results   Component Value Date    EGFR 37 07/05/2023    EGFR 43 08/22/2022    EGFR 41 08/12/2022    CREATININE 1.73 (H) 07/05/2023    CREATININE 1.56 (H) 08/22/2022    CREATININE 1.61 (H) 08/12/2022   Cr within baseline  Avoid nephrotoxins.

## 2023-07-05 NOTE — ASSESSMENT & PLAN NOTE
Monitor on telemetry  EKG NSR, some t wave flattening. orthostatic vital signs positive. Most likely syncope 2/2 volume depletion. Pt states was not taking in adequate fluids.   IVF   PT/OT evals

## 2023-07-05 NOTE — H&P
[facility]  H&P  Name: Matt Blue 78 y.o. male I MRN: [id number]  Unit/Bed#: ED 04 I Date of Admission: 7/5/2023   Date of Service: 7/5/2023 I Hospital Day: 0      Assessment/Plan   Syncope  Assessment & Plan  Monitor on telemetry  EKG NSR, some t wave flattening. orthostatic vital signs positive. Most likely syncope 2/2 volume depletion. Pt states was not taking in adequate fluids. IVF   PT/OT evals        CKD (chronic kidney disease) stage 3, GFR 30-59 ml/min [facility])  Assessment & Plan  Lab Results   Component Value Date    EGFR 37 07/05/2023    EGFR 43 08/22/2022    EGFR 41 08/12/2022    CREATININE 1.73 (H) 07/05/2023    CREATININE 1.56 (H) 08/22/2022    CREATININE 1.61 (H) 08/12/2022   Cr within baseline  Avoid nephrotoxins. Chief Complaint   Patient presents with   • Dizziness     Patient arrives via EMS from home. Reports two episodes of near syncope, each five minutes apart. Last episode was 1 hour ago. Patient still reporting mild dizziness. Did not fall or hit head, negative blood thinners. HPI:  Matt Blue is a 78 y.o. male who presents with syncopal episode. Patient states he was not taking in good oral intake. Patient states the syncope over occurred earlier today. He felt lightheaded. Then he passed out. He did not have a fall. No head trauma. Patient was hydrated and feels a little bit better since receiving hydration but is still not yet at baseline. Patient was noted to be orthostatic positive upon standing and we have been asked to evaluate further. Patient on no chest pain. No fevers. No chills. No sick contacts. No dysuria. No diarrhea.     Historical Information   Past Medical History:   Diagnosis Date   • Anemia    • Anxiety    • Bilateral inguinal hernia 04/23/2022    Evaluated @ [facility]   • Bronchitis    • Chronic kidney disease     ckd 3   • Cognitive communication disorder    • Colitis    • Hyperlipidemia    • Pre-diabetes • Trauma     mechanical fall down stairs     Past Surgical History:   Procedure Laterality Date   • CATARACT EXTRACTION     • COLONOSCOPY     • COLONOSCOPY W/ BIOPSIES  2001    Jordon Cook MD   • MOHS RECONSTRUCTION      basal ca   • AL RPR 1ST INGUN HRNA AGE 5 YRS/> REDUCIBLE Bilateral 8/10/2022    Procedure: REPAIR HERNIA INGUINAL;  Surgeon: Aubree Jackson MD;  Location: [facility];  Service: General     Social History   Social History     Substance and Sexual Activity   Alcohol Use Yes    Comment: rare     Social History     Substance and Sexual Activity   Drug Use Not Currently     Social History     Tobacco Use   Smoking Status Former   • Years: 21.00   • Types: Cigarettes   • Quit date:    • Years since quittin.5   Smokeless Tobacco Never     No family history of syncope    Meds/Allergies   No Known Allergies    Meds:    Current Facility-Administered Medications:   •  sodium chloride 0.9 % infusion, 150 mL/hr, Intravenous, Continuous, Edna Stiles PA-C    Current Outpatient Medications:   •  acetaminophen (TYLENOL) 325 mg tablet, Take 3 tablets (975 mg total) by mouth every 8 (eight) hours as needed for mild pain, Disp: , Rfl: 0  •  docusate sodium (COLACE) 100 mg capsule, Take 100 mg by mouth in the morning (Patient not taking: Reported on 2022), Disp: , Rfl:   •  methocarbamol (ROBAXIN) 500 mg tablet, Take 1 tablet (500 mg total) by mouth every 6 (six) hours (Patient not taking: No sig reported), Disp: 20 tablet, Rfl: 0  •  polyethylene glycol (MIRALAX) 17 g packet, Take 17 g by mouth daily, Disp: , Rfl:     (Not in a hospital admission)        Review of Systems:    A complete and comprehensive 14 point organ system review was performed and all other systems are negative other than stated above in the HPI    Current Vitals:   Blood Pressure: 97/64 (23 1543)  Pulse: 71 (23 1543)  Temperature: 99.2 °F (37.3 °C) (23 1305)  Temp Source: Oral (07/05/23 1305)  Respirations: 19 (07/05/23 1543)  Height: 6' (182.9 cm) (07/05/23 1305)  Weight - Scale: 77.1 kg (170 lb) (07/05/23 1305)  SpO2: 99 % (07/05/23 1530)  SPO2 RA Rest    Flowsheet Row ED from 7/5/2023 in  [facility] Emergency Department   SpO2 99 %   SpO2 Activity At Rest   O2 Device None (Room air)   O2 Flow Rate --        No intake or output data in the 24 hours ending 07/05/23 1613  Body mass index is 23.06 kg/m².      Physical Exam:         General: well appearing, no acute distress  HEENT: atraumatic, PERRLA, dry mm, normal pharynx, normal tonsils and adenoids, normal tongue, no fluid in sinuses  Neck: Trachea midline, no carotid bruit, no masses  Respiratory: normal chest wall expansion, CTA B, no r/r/w, no rubs  Cardiovascular: RRR, no m/r/g, Normal S1 and S2  Abdomen: Soft, non-tender, non-distended, normal bowel sounds in all quadrants, no hepatosplenomegaly, no tympany  Rectal: deferred  Musculoskeletal: normal ROM in upper and lower extremities  Integumentary: warm, dry, and pink, with no rash, purpura, or petechia  Heme/Lymph: no lymphadenopathy, no bruises  Neurological: Cranial Nerves II-XII grossly intact; no focal deficits in sensation or strength, A  x O x 3  Psychiatric: cooperative with normal mood, affect, and cognition    Lab Results:   CBC:   Lab Results   Component Value Date    WBC 6.47 07/05/2023    HGB 11.3 (L) 07/05/2023    HCT 34.7 (L) 07/05/2023     (H) 07/05/2023     (L) 07/05/2023    RBC 3.45 (L) 07/05/2023    MCH 32.8 07/05/2023    MCHC 32.6 07/05/2023    RDW 12.3 07/05/2023    MPV 11.4 07/05/2023    NRBC 0 07/05/2023     CMP:  Lab Results   Component Value Date     (H) 07/05/2023     07/15/2022    CO2 22 07/05/2023    CO2 28 07/15/2022    BUN 28 (H) 07/05/2023    BUN 28 (H) 07/15/2022    CREATININE 1.73 (H) 07/05/2023    CALCIUM 8.7 07/05/2023    CALCIUM 9.0 07/15/2022    AST 25 07/05/2023    AST 19 07/15/2022    ALT 23 07/05/2023    ALT 17 07/15/2022    ALKPHOS 56 07/05/2023    ALKPHOS 77 07/15/2022    EGFR 37 07/05/2023     No results found for: "TROPONINI", "CKMB", "CKTOTAL"  Coagulation: No results found for: "PT", "INR", "APTT" Urinalysis:  Lab Results   Component Value Date    COLORU Yellow 07/05/2023    CLARITYU Clear 07/05/2023    SPECGRAV 1.025 07/05/2023    PHUR 5.0 07/05/2023    LEUKOCYTESUR Negative 07/05/2023    NITRITE Negative 07/05/2023    GLUCOSEU Negative 07/05/2023    KETONESU Negative 07/05/2023    BILIRUBINUR Negative 07/05/2023    BLOODU Negative 07/05/2023      Amylase: No results found for: "AMYLASE"  Lipase: No results found for: "LIPASE"     Imaging: XR chest 1 view portable    Result Date: 7/5/2023  Narrative: CHEST INDICATION:   near syncope. COMPARISON: CXR 5/9/2022 and abdomen CT 5/14/2022. EXAM PERFORMED/VIEWS:  XR CHEST PORTABLE. FINDINGS: Cardiomediastinal silhouette normal. Lungs clear. No effusion or pneumothorax. Upper abdomen normal. Bones normal for age. Impression: No acute cardiopulmonary disease. Workstation performed: [id number]     EKG, Pathology, and Other Studies: I have personally reviewed the results. VTE   Prophylaxis: In place    Code Status: Prior    Anticipated Length of Stay:  Patient will be admitted on an Observation basis with an anticipated length of stay of  Less 2 midnights. Counseling / Coordination of Care  Total floor / unit time spent today 40 minutes. Greater than 50% of total time was spent with the patient and / or family counseling and / or coordination of care.      "This note has been constructed using a voice recognition system"      Nicho Day MD  7/5/2023, 4:13 PM

## 2023-07-06 ENCOUNTER — APPOINTMENT (INPATIENT)
Dept: MRI IMAGING | Facility: HOSPITAL | Age: 78
DRG: 312 | End: 2023-07-06
Payer: COMMERCIAL

## 2023-07-06 LAB
ALBUMIN SERPL BCP-MCNC: 3.7 G/DL (ref 3.5–5)
ALP SERPL-CCNC: 57 U/L (ref 34–104)
ALT SERPL W P-5'-P-CCNC: 37 U/L (ref 7–52)
ANION GAP SERPL CALCULATED.3IONS-SCNC: 6 MMOL/L
AST SERPL W P-5'-P-CCNC: 39 U/L (ref 13–39)
ATRIAL RATE: 62 BPM
BASOPHILS # BLD AUTO: 0.03 THOUSANDS/ÂΜL (ref 0–0.1)
BASOPHILS NFR BLD AUTO: 1 % (ref 0–1)
BILIRUB SERPL-MCNC: 0.61 MG/DL (ref 0.2–1)
BUN SERPL-MCNC: 21 MG/DL (ref 5–25)
CALCIUM SERPL-MCNC: 8.2 MG/DL (ref 8.4–10.2)
CHLORIDE SERPL-SCNC: 114 MMOL/L (ref 96–108)
CO2 SERPL-SCNC: 19 MMOL/L (ref 21–32)
CREAT SERPL-MCNC: 1.29 MG/DL (ref 0.6–1.3)
EOSINOPHIL # BLD AUTO: 0.07 THOUSAND/ÂΜL (ref 0–0.61)
EOSINOPHIL NFR BLD AUTO: 1 % (ref 0–6)
ERYTHROCYTE [DISTWIDTH] IN BLOOD BY AUTOMATED COUNT: 12.2 % (ref 11.6–15.1)
GFR SERPL CREATININE-BSD FRML MDRD: 53 ML/MIN/1.73SQ M
GLUCOSE SERPL-MCNC: 93 MG/DL (ref 65–140)
HCT VFR BLD AUTO: 35.8 % (ref 36.5–49.3)
HGB BLD-MCNC: 11.7 G/DL (ref 12–17)
IMM GRANULOCYTES # BLD AUTO: 0.04 THOUSAND/UL (ref 0–0.2)
IMM GRANULOCYTES NFR BLD AUTO: 1 % (ref 0–2)
LYMPHOCYTES # BLD AUTO: 1.45 THOUSANDS/ÂΜL (ref 0.6–4.47)
LYMPHOCYTES NFR BLD AUTO: 24 % (ref 14–44)
MCH RBC QN AUTO: 32.3 PG (ref 26.8–34.3)
MCHC RBC AUTO-ENTMCNC: 32.7 G/DL (ref 31.4–37.4)
MCV RBC AUTO: 99 FL (ref 82–98)
MONOCYTES # BLD AUTO: 0.5 THOUSAND/ÂΜL (ref 0.17–1.22)
MONOCYTES NFR BLD AUTO: 8 % (ref 4–12)
NEUTROPHILS # BLD AUTO: 4.04 THOUSANDS/ÂΜL (ref 1.85–7.62)
NEUTS SEG NFR BLD AUTO: 65 % (ref 43–75)
NRBC BLD AUTO-RTO: 0 /100 WBCS
P AXIS: -14 DEGREES
PLATELET # BLD AUTO: 116 THOUSANDS/UL (ref 149–390)
PMV BLD AUTO: 10.8 FL (ref 8.9–12.7)
POTASSIUM SERPL-SCNC: 4 MMOL/L (ref 3.5–5.3)
PR INTERVAL: 128 MS
PROT SERPL-MCNC: 5.8 G/DL (ref 6.4–8.4)
QRS AXIS: 46 DEGREES
QRSD INTERVAL: 76 MS
QT INTERVAL: 392 MS
QTC INTERVAL: 397 MS
RBC # BLD AUTO: 3.62 MILLION/UL (ref 3.88–5.62)
SODIUM SERPL-SCNC: 139 MMOL/L (ref 135–147)
T WAVE AXIS: 54 DEGREES
VENTRICULAR RATE: 62 BPM
WBC # BLD AUTO: 6.13 THOUSAND/UL (ref 4.31–10.16)

## 2023-07-06 PROCEDURE — 80053 COMPREHEN METABOLIC PANEL: CPT | Performed by: HOSPITALIST

## 2023-07-06 PROCEDURE — 99232 SBSQ HOSP IP/OBS MODERATE 35: CPT | Performed by: HOSPITALIST

## 2023-07-06 PROCEDURE — 93010 ELECTROCARDIOGRAM REPORT: CPT | Performed by: INTERNAL MEDICINE

## 2023-07-06 PROCEDURE — 70551 MRI BRAIN STEM W/O DYE: CPT

## 2023-07-06 PROCEDURE — 85025 COMPLETE CBC W/AUTO DIFF WBC: CPT | Performed by: HOSPITALIST

## 2023-07-06 RX ADMIN — SODIUM CHLORIDE 150 ML/HR: 0.9 INJECTION, SOLUTION INTRAVENOUS at 04:26

## 2023-07-06 RX ADMIN — SODIUM CHLORIDE 150 ML/HR: 0.9 INJECTION, SOLUTION INTRAVENOUS at 18:17

## 2023-07-06 RX ADMIN — HEPARIN SODIUM 5000 UNITS: 5000 INJECTION INTRAVENOUS; SUBCUTANEOUS at 06:04

## 2023-07-06 RX ADMIN — DOCUSATE SODIUM 100 MG: 100 CAPSULE, LIQUID FILLED ORAL at 09:10

## 2023-07-06 RX ADMIN — HEPARIN SODIUM 5000 UNITS: 5000 INJECTION INTRAVENOUS; SUBCUTANEOUS at 14:04

## 2023-07-06 RX ADMIN — SENNOSIDES 8.6 MG: 8.6 TABLET, FILM COATED ORAL at 09:10

## 2023-07-06 RX ADMIN — HEPARIN SODIUM 5000 UNITS: 5000 INJECTION INTRAVENOUS; SUBCUTANEOUS at 21:16

## 2023-07-06 RX ADMIN — POLYETHYLENE GLYCOL 3350 17 G: 17 POWDER, FOR SOLUTION ORAL at 09:09

## 2023-07-06 NOTE — OCCUPATIONAL THERAPY NOTE
Occupational Therapy Screen Note     Patient Name: Zelda HOOPER Date: 7/6/2023  Problem List  Principal Problem:    Syncope  Active Problems:    CKD (chronic kidney disease) stage 3, GFR 30-59 ml/min ([address])          07/06/23 0952   OT Last Visit   OT Visit Date 07/06/23   Note Type   Note type Screen   Additional Comments OT orders received. Chart reviewed. Spoke with JOSSIE Reyes who endorses that pt is independent with mobility within room. No acute OT needs indicated at this time. Will DC from OT caseload.            Frankey Bonus, OTR/L

## 2023-07-06 NOTE — PLAN OF CARE
Problem: CARDIOVASCULAR - ADULT  Goal: Maintains optimal cardiac output and hemodynamic stability  Description: INTERVENTIONS:  - Monitor I/O, vital signs and rhythm  - Monitor for S/S and trends of decreased cardiac output  - Administer and titrate ordered vasoactive medications to optimize hemodynamic stability  - Assess quality of pulses, skin color and temperature  - Assess for signs of decreased coronary artery perfusion  - Instruct patient to report change in severity of symptoms  Outcome: Progressing     Problem: GASTROINTESTINAL - ADULT  Goal: Maintains adequate nutritional intake  Description: INTERVENTIONS:  - Monitor percentage of each meal consumed  - Identify factors contributing to decreased intake, treat as appropriate  - Assist with meals as needed  - Monitor I&O, weight, and lab values if indicated  - Obtain nutrition services referral as needed  Outcome: Progressing

## 2023-07-06 NOTE — ASSESSMENT & PLAN NOTE
Lab Results   Component Value Date    EGFR 53 07/06/2023    EGFR 37 07/05/2023    EGFR 43 08/22/2022    CREATININE 1.29 07/06/2023    CREATININE 1.73 (H) 07/05/2023    CREATININE 1.56 (H) 08/22/2022   Cr within baseline  Avoid nephrotoxins.

## 2023-07-06 NOTE — UTILIZATION REVIEW
Initial Clinical Review  Date: 7/7/23    Day 3: Has surpassed a 2nd midnight with active treatments and services, which include monitoring of BP in setting of orthostasis, dizziness and continued IVF and start of Meclizine. Admission: Date/Time/Statement:  7/5/23 1551 observation AND CHANGED 7/6/23 1141 INPATIENT RE: PATIENT NEEDS > 2 MIDNIGHT STAY DUE TO ONGOING DIZZINESS POST RESUSCITATION AN OF IVF FOR DEHYDRATION, CHECK MRI & ECHO. Admission Orders (From admission, onward)     Ordered        07/06/23 1141  Inpatient Admission  Once                      Orders Placed This Encounter   Procedures   • Inpatient Admission     Standing Status:   Standing     Number of Occurrences:   1     Order Specific Question:   Level of Care     Answer:   Med Surg [16]     Order Specific Question:   Estimated length of stay     Answer:   More than 2 Midnights     Order Specific Question:   Certification     Answer:   I certify that inpatient services are medically necessary for this patient for a duration of greater than two midnights. See H&P and MD Progress Notes for additional information about the patient's course of treatment. ED Arrival Information     Expected   -    Arrival   7/5/2023 12:58    Acuity   Urgent            Means of arrival   Ambulance    Escorted by   [address] [place] [address] EMS    Service   Hospitalist    Admission type   Emergency            Arrival complaint   Near Syncope           Chief Complaint   Patient presents with   • Dizziness     Patient arrives via EMS from home. Reports two episodes of near syncope, each five minutes apart. Last episode was 1 hour ago. Patient still reporting mild dizziness. Did not fall or hit head, negative blood thinners. Initial Presentation: 78 y.o. male from home to ED via ems admitted to observation [address]   due to Syncope. PMH of CKD.    Presented due to syncopal episodes starting about 2 hours ago, felt lightheaded while standing and eating and passed out. Has continued lightheadedness . On exam hypotensive. Bun 28, creatinine 1.73 at baseline. Glucose 172. H&H 11.3/34.7. Ecg some T wave flattening. Sinus. In the ED orthostatic hypotension. Given 1 liter IVF bolus and IVF started at 150 ml/Hr. Plan is continued IVF. Telemetry. PT/OT    Date: 7/6/23    Day 2:  CHANGED TO INPATIENT:  Has continue dizziness. Dizziness worse exertion, still present at rest.   Is slightly better than yesterday. On exam no focal deficits. H&H 11.7/35. 8. Plan is continue telemetry. Check MRI, echo. PT/OT, continue IVF. Date: 7/7/23    Day 3: Has surpassed a 2nd midnight with active treatments and services, which include monitoring of BP in setting of orthostasis, dizziness and continued IVF. On exam: impulsive. Alert and oriented. Telemetry sinus bradycardia.       ED Triage Vitals [07/05/23 1305]   Temperature Pulse Respirations Blood Pressure SpO2   99.2 °F (37.3 °C) 69 20 99/56 97 %      Temp Source Heart Rate Source Patient Position - Orthostatic VS BP Location FiO2 (%)   Oral Monitor Lying Right arm --      Pain Score       No Pain          Wt Readings from Last 1 Encounters:   07/07/23 78.6 kg (173 lb 4.8 oz)     Additional Vital Signs:   07/07/23 07:18:46 97.4 °F (36.3 °C) Abnormal  56 16 157/77 104 100 % -- --   07/06/23 20:05:11 98.2 °F (36.8 °C) 57 16 160/77 105 99 % -- --   07/06/23 14:53:18 97.4 °F (36.3 °C) Abnormal  70 18 164/85 111 98 % -- --   07/06/23 09:15:54 -- 78 -- 123/68 86 99 % -- Standing - Orthostatic VS   07/06/23 09:14:22 -- 62 -- 126/69 88 99 % -- Sitting - Orthostatic VS   07/06/23 09:12:41 -- 60 -- 127/71 90 99 % -- Lying - Orthostatic      07/05/23 23:37:45 -- 54 Abnormal  -- 149/77 101 97 % -- --   07/05/23 16:43:14 98.1 °F (36.7 °C) 56 17 149/78 102 100 % None (Room air) --   07/05/23 1543 -- 71 19 97/64 -- -- -- Standing - Orthostatic VS   07/05/23 1540 -- 64 18 92/53 -- -- -- Standing - Orthostatic VS   07/05/23 1539 -- 59 18 112/60 -- -- -- Sitting - Orthostatic VS   07/05/23 1538 -- 57 20 129/71 -- -- -- Lying - Orthostatic VS   07/05/23 1530 -- 58 20 130/66 93 99 % -- --   07/05/23 1430 -- 59 22 125/65 89 99 % -- --   07/05/23 1400 -- 72  18 83/50 Abnormal   -- -- -- Standing - Orthostatic VS    07/05/23 1358 -- 64  18 94/50  -- -- -- Sitting - Orthostatic VS    07/05/23 1356 -- 62  18 110/55  -- -- -- Lying - Orthostatic VS    07/05/23 1332 -- 64 18 109/55 73 98 % None (Room air) --     Pertinent Labs/Diagnostic Test Results:   MRI brain wo contrast   Final Result by Moncho Hoffman MD (07/06 2248)      No MR evidence of acute ischemia. Chronic microangiopathy. Workstation performed: [id number]         XR chest 1 view portable   ED Interpretation by Nilsa Berumen PA-C (07/05 1431)   No acute abnormalities. Final Result by Shakira Alvarez MD (07/05 1435)      No acute cardiopulmonary disease.                Workstation performed: [id number]             7/5/23 ecg Rate:     ECG rate:  62   Rhythm:     Rhythm: sinus rhythm     Conduction:     Conduction: normal     ST segments:     ST segments:  Normal   T waves:     T waves: normal    Results from last 7 days   Lab Units 07/07/23  0400 07/06/23  0545 07/05/23  1340   WBC Thousand/uL 5.97 6.13 6.47   HEMOGLOBIN g/dL 10.6* 11.7* 11.3*   HEMATOCRIT % 33.2* 35.8* 34.7*   PLATELETS Thousands/uL 110* 116* 140*   NEUTROS ABS Thousands/µL 3.70 4.04 5.01     Results from last 7 days   Lab Units 07/07/23  0400 07/06/23  0545 07/05/23  1340   SODIUM mmol/L 141 139 139   POTASSIUM mmol/L 4.2 4.0 4.6   CHLORIDE mmol/L 114* 114* 111*   CO2 mmol/L 24 19* 22   ANION GAP mmol/L 3 6 6   BUN mg/dL 16 21 28*   CREATININE mg/dL 1.26 1.29 1.73*   EGFR ml/min/1.73sq m 55 53 37   CALCIUM mg/dL 8.3* 8.2* 8.7     Results from last 7 days   Lab Units 07/07/23  0400 07/06/23  0545 07/05/23  1340   AST U/L 23 39 25   ALT U/L 28 37 23   ALK PHOS U/L 49 57 56   TOTAL PROTEIN g/dL 5. 3* 5.8* 6.1*   ALBUMIN g/dL 3.3* 3.7 3.8   TOTAL BILIRUBIN mg/dL 0.41 0.61 0.54     Results from last 7 days   Lab Units 07/07/23  0400 07/06/23  0545 07/05/23  1340   GLUCOSE RANDOM mg/dL 90 93 172*     Results from last 7 days   Lab Units 07/05/23  1809 07/05/23  1627 07/05/23  1340   HS TNI 0HR ng/L  --   --  6   HS TNI 2HR ng/L  --  7  --    HSTNI D2 ng/L  --  1  --    HS TNI 4HR ng/L 7  --   --    HSTNI D4 ng/L 1  --   --      Results from last 7 days   Lab Units 07/05/23  1601   CLARITY UA  Clear   COLOR UA  Yellow   SPEC GRAV UA  1.025   PH UA  5.0   GLUCOSE UA mg/dl Negative   KETONES UA mg/dl Negative   BLOOD UA  Negative   PROTEIN UA mg/dl Trace*   NITRITE UA  Negative   BILIRUBIN UA  Negative   UROBILINOGEN UA (BE) mg/dl <2.0   LEUKOCYTES UA  Negative   WBC UA /hpf 0-1   RBC UA /hpf None Seen   BACTERIA UA /hpf Occasional   EPITHELIAL CELLS WET PREP /hpf Occasional       ED Treatment:   Medication Administration from 07/05/2023 1258 to 07/05/2023 1636       Date/Time Order Dose Route Action Comments     07/05/2023 1401 EDT sodium chloride 0.9 % bolus 1,000 mL 1,000 mL Intravenous New Bag --     07/05/2023 1627 EDT sodium chloride 0.9 % infusion 150 mL/hr Intravenous New Bag --        Past Medical History:   Diagnosis Date   • Anemia    • Anxiety    • Bilateral inguinal hernia 04/23/2022    Evaluated @ [facility]   • Bronchitis    • Chronic kidney disease     ckd 3   • Cognitive communication disorder    • Colitis    • Hyperlipidemia    • Pre-diabetes    • Trauma     mechanical fall down stairs     Present on Admission:  **None**      Admitting Diagnosis: Orthostatic hypotension [I95.1]  Dehydration [E86.0]  Dizziness [R42]  Lightheadedness [R42]  Syncope [R55]  Age/Sex: 78 y.o. male  Admission Orders:  Scheduled Medications:  docusate sodium, 100 mg, Oral, BID  heparin (porcine), 5,000 Units, Subcutaneous, Q8H [address]  polyethylene glycol, 17 g, Oral, Daily  senna, 1 tablet, Oral, Daily    meclizine (ANTIVERT) tablet 25 mg  Dose: 25 mg  Freq: Every 8 hours scheduled Route: PO  Start: 07/07/23 0815 End: 07/08/23 2159      Continuous IV Infusions:  sodium chloride, 150 mL/hr, Intravenous, Continuous      PRN Meds: not used   acetaminophen, 975 mg, Oral, Q8H PRN    Telemetry       Network Utilization Review Department  ATTENTION: Please call with any questions or concerns to [phone] and carefully listen to the prompts so that you are directed to the right person. All voicemails are confidential.  Richard Rasmussen all requests for admission clinical reviews, approved or denied determinations and any other requests to dedicated fax number below belonging to the campus where the patient is receiving treatment.  List of dedicated fax numbers for the Facilities:  [place] DENIALS (Administrative/Medical Necessity) [phone] [address] [facility] (Maternity/NICU/Pediatrics) [phone]   [address] [place] [address] [phone]   [facility] [address] [facility] [phone] [address] [place] [address] [phone]   [address] [facility] [address] [facility] [phone]

## 2023-07-06 NOTE — PROGRESS NOTES
[facility]  Progress Note  Name: Carey Fernandez  MRN: [id number]  Unit/Bed#: -99 I Date of Admission: 2023   Date of Service: 2023 I Hospital Day: 0    Assessment/Plan   CKD (chronic kidney disease) stage 3, GFR 30-59 ml/min [facility])  Assessment & Plan  Lab Results   Component Value Date    EGFR 53 2023    EGFR 37 2023    EGFR 43 2022    CREATININE 1.29 2023    CREATININE 1.73 (H) 2023    CREATININE 1.56 (H) 2022   Cr within baseline  Avoid nephrotoxins. * Syncope  Assessment & Plan  Monitor on telemetry  EKG NSR, some t wave flattening. orthostatic vital signs positive. Most likely syncope 2/2 volume depletion. Pt states was not taking in adequate fluids. IVF with improvement of orthostasis  However still reports ongoing dizziness. Check MRI/echo. PT/OT evals                VTE  Prophylaxis:   Pharmacologic: in place  Mechanical VTE Prophylaxis in Place: Yes    Patient Centered Rounds: I have performed bedside rounds with nursing staff today. Discussions with Specialists or Other Care Team Provider: case management    Education and Discussions with Family / Patient:  pt      Current Length of Stay: 0 day(s)    Current Patient Status: Inpatient        Code Status: Level 1 - Full Code    Discharge Plan: Pt will require continued inpatient hospitalization. Subjective:   Pt does feel slightly better than yesterday however states is not yet at baseline with still dizziness exertional and even at rest.     Patient is seen and examined at bedside. All other ROS are negative. Objective:     Vitals:   Temp (24hrs), Av.4 °F (36.9 °C), Min:98 °F (36.7 °C), Max:99.2 °F (37.3 °C)    Temp:  [98 °F (36.7 °C)-99.2 °F (37.3 °C)] 98 °F (36.7 °C)  HR:  [54-78] 78  Resp:  [16-22] 16  BP: ()/(50-79) 123/68  SpO2:  [97 %-100 %] 99 %  Body mass index is 23.32 kg/m². Input and Output Summary (last 24 hours):        Intake/Output Summary (Last 24 hours) at 7/6/2023 1143  Last data filed at 7/5/2023 1627  Gross per 24 hour   Intake 1000 ml   Output --   Net 1000 ml       Physical Exam:       GEN: No acute distress, comfortable  HEEENT: No JVD, PERRLA, no scleral icterus  RESP: Lungs clear to auscultation bilaterally  CV: RRR, +s1/s2   ABD: SOFT NON TENDER, POSITIVE BOWEL SOUNDS, NO DISTENTION  PSYCH: CALM  NEURO: Mentation baseline, NO FOCAL DEFICITS  SKIN: NO RASH  EXTREM: NO EDEMA    Additional Data:     Labs:    Results from last 7 days   Lab Units 07/06/23  0545   WBC Thousand/uL 6.13   HEMOGLOBIN g/dL 11.7*   HEMATOCRIT % 35.8*   PLATELETS Thousands/uL 116*   NEUTROS PCT % 65   LYMPHS PCT % 24   MONOS PCT % 8   EOS PCT % 1     Results from last 7 days   Lab Units 07/06/23  0545   SODIUM mmol/L 139   POTASSIUM mmol/L 4.0   CHLORIDE mmol/L 114*   CO2 mmol/L 19*   BUN mg/dL 21   CREATININE mg/dL 1.29   ANION GAP mmol/L 6   CALCIUM mg/dL 8.2*   ALBUMIN g/dL 3.7   TOTAL BILIRUBIN mg/dL 0.61   ALK PHOS U/L 57   ALT U/L 37   AST U/L 39   GLUCOSE RANDOM mg/dL 93                       Lines/Drains:  Invasive Devices     Peripheral Intravenous Line  Duration           Peripheral IV 07/05/23 Proximal;Right;Ventral (anterior) Forearm <1 day                Telemetry:   Telemetry Orders (From admission, onward)             24 Hour Telemetry Monitoring  (ED Bridging Orders Panel)  Continuous x 24 Hours (Telem)        Expiring   Question:  Reason for 24 Hour Telemetry  Answer:  Syncope suspected to be cardiac in origin                    * I Have Reviewed All Lab Data Listed Above. Imaging:     Results for orders placed during the hospital encounter of 07/05/23    XR chest 1 view portable    Narrative  CHEST    INDICATION:   near syncope. COMPARISON: CXR 5/9/2022 and abdomen CT 5/14/2022. EXAM PERFORMED/VIEWS:  XR CHEST PORTABLE. FINDINGS:    Cardiomediastinal silhouette normal.    Lungs clear. No effusion or pneumothorax.     Upper abdomen normal. Bones normal for age. Impression  No acute cardiopulmonary disease. Workstation performed: [id number]    Results for orders placed during the hospital encounter of 05/09/22    XR chest pa & lateral    Narrative  CHEST    INDICATION:   K40.90: Unilateral inguinal hernia, without obstruction or gangrene, not specified as recurrent. COMPARISON:  None    EXAM PERFORMED/VIEWS:  XR CHEST PA & LATERAL  DUAL ENERGY SUBTRACTION. FINDINGS:    Cardiomediastinal silhouette appears unremarkable. The lungs are clear. No pneumothorax or pleural effusion. Osseous structures appear within normal limits for patient age. Impression  No acute cardiopulmonary disease. Workstation performed: [id number]      *I have reviewed all imaging reports listed above      Recent Cultures (last 7 days):           Last 24 Hours Medication List:   Current Facility-Administered Medications   Medication Dose Route Frequency Provider Last Rate   • acetaminophen  975 mg Oral Q8H PRN Nicho Day MD     • docusate sodium  100 mg Oral BID Nicho Day MD     • heparin (porcine)  5,000 Units Subcutaneous [facility] Nicho Day MD     • polyethylene glycol  17 g Oral Daily Nicho Day MD     • senna  1 tablet Oral Daily Nicho Day MD     • sodium chloride  150 mL/hr Intravenous Continuous Demtera Stratton PA-C 150 mL/hr (07/06/23 0426)        Today, Patient Was Seen By: Nicho Day MD    ** Please Note: Dictation voice to text software may have been used in the creation of this document.  **

## 2023-07-06 NOTE — PHYSICAL THERAPY NOTE
Physical Therapy Screen    Patient Name: Negin Matson    [id number] Date: 7/6/2023     Problem List  Principal Problem:    Syncope  Active Problems:    CKD (chronic kidney disease) stage 3, GFR 30-59 ml/min [facility])       Past Medical History  Past Medical History:   Diagnosis Date    Anemia     Anxiety     Bilateral inguinal hernia 04/23/2022    Evaluated @ [facility]    Bronchitis     Chronic kidney disease     ckd 3    Cognitive communication disorder     Colitis     Hyperlipidemia     Pre-diabetes     Trauma     mechanical fall down stairs        Past Surgical History  Past Surgical History:   Procedure Laterality Date    CATARACT EXTRACTION      COLONOSCOPY      COLONOSCOPY W/ BIOPSIES  11/12/2001    Sola Arcos MD    MOHS RECONSTRUCTION      basal ca    SD RPR 1ST INGUN HRNA AGE 5 YRS/> REDUCIBLE Bilateral 8/10/2022    Procedure: REPAIR HERNIA INGUINAL;  Surgeon: Cassie Jett MD;  Location: [facility];  Service: General           07/06/23 1001   PT Last Visit   PT Visit Date 07/06/23   Note Type   Note type Screen   Additional Comments Chart review completed. Spone with RN, Patt Hannah, who reports that connie clementss been independent in the room. No inpatient P.T. needs. Will discharge orders. Dmitriy Abdi

## 2023-07-06 NOTE — PLAN OF CARE
Problem: CARDIOVASCULAR - ADULT  Goal: Maintains optimal cardiac output and hemodynamic stability  Description: INTERVENTIONS:  - Monitor I/O, vital signs and rhythm  - Monitor for S/S and trends of decreased cardiac output  - Administer and titrate ordered vasoactive medications to optimize hemodynamic stability  - Assess quality of pulses, skin color and temperature  - Assess for signs of decreased coronary artery perfusion  - Instruct patient to report change in severity of symptoms  Outcome: Progressing     Problem: GASTROINTESTINAL - ADULT  Goal: Maintains adequate nutritional intake  Description: INTERVENTIONS:  - Monitor percentage of each meal consumed  - Identify factors contributing to decreased intake, treat as appropriate  - Assist with meals as needed  - Monitor I&O, weight, and lab values if indicated  - Obtain nutrition services referral as needed  Outcome: Progressing     Problem: METABOLIC, FLUID AND ELECTROLYTES - ADULT  Goal: Fluid balance maintained  Description: INTERVENTIONS:  - Monitor labs   - Monitor I/O and WT  - Instruct patient on fluid and nutrition as appropriate  - Assess for signs & symptoms of volume excess or deficit  Outcome: Progressing     Problem: SAFETY ADULT  Goal: Patient will remain free of falls  Description: INTERVENTIONS:  - Educate patient/family on patient safety including physical limitations  - Instruct patient to call for assistance with activity   - Consult OT/PT to assist with strengthening/mobility   - Keep Call bell within reach  - Keep bed low and locked with side rails adjusted as appropriate  - Keep care items and personal belongings within reach  - Initiate and maintain comfort rounds  - Make Fall Risk Sign visible to staff  - Offer Toileting every 2 Hours, in advance of need  - Initiate/Maintain alarm  - Obtain necessary fall risk management equipment  - Apply yellow socks and bracelet for high fall risk patients  - Consider moving patient to room near nurses station  Outcome: Progressing  Goal: Maintain or return to baseline ADL function  Description: INTERVENTIONS:  -  Assess patient's ability to carry out ADLs; assess patient's baseline for ADL function and identify physical deficits which impact ability to perform ADLs (bathing, care of mouth/teeth, toileting, grooming, dressing, etc.)  - Assess/evaluate cause of self-care deficits   - Assess range of motion  - Assess patient's mobility; develop plan if impaired  - Assess patient's need for assistive devices and provide as appropriate  - Encourage maximum independence but intervene and supervise when necessary  - Involve family in performance of ADLs  - Assess for home care needs following discharge   - Consider OT consult to assist with ADL evaluation and planning for discharge  - Provide patient education as appropriate  Outcome: Progressing  Goal: Maintains/Returns to pre admission functional level  Description: INTERVENTIONS:  - Perform BMAT or MOVE assessment daily.   - Set and communicate daily mobility goal to care team and patient/family/caregiver. - Collaborate with rehabilitation services on mobility goals if consulted  - Perform Range of Motion 3 times a day. - Reposition patient every 3 hours.   - Dangle patient 3 times a day  - Stand patient 3 times a day  - Ambulate patient 3 times a day  - Out of bed to chair 3 times a day   - Out of bed for meals 3 times a day  - Out of bed for toileting  - Record patient progress and toleration of activity level   Outcome: Progressing     Problem: DISCHARGE PLANNING  Goal: Discharge to home or other facility with appropriate resources  Description: INTERVENTIONS:  - Identify barriers to discharge w/patient and caregiver  - Arrange for needed discharge resources and transportation as appropriate  - Identify discharge learning needs (meds, wound care, etc.)  - Arrange for interpretive services to assist at discharge as needed  - Refer to Case Management Department for coordinating discharge planning if the patient needs post-hospital services based on physician/advanced practitioner order or complex needs related to functional status, cognitive ability, or social support system  Outcome: Progressing     Problem: Knowledge Deficit  Goal: Patient/family/caregiver demonstrates understanding of disease process, treatment plan, medications, and discharge instructions  Description: Complete learning assessment and assess knowledge base.   Interventions:  - Provide teaching at level of understanding  - Provide teaching via preferred learning methods  Outcome: Progressing

## 2023-07-06 NOTE — CASE MANAGEMENT
Case Management Assessment & Discharge Planning Note    Patient name Matt Blue  Location /-32 MRN [id number]  : 1945 Date 2023       Current Admission Date: 2023  Current Admission Diagnosis:Syncope   Patient Active Problem List    Diagnosis Date Noted   • Syncope 2023   • H/O bilateral inguinal hernia repair 2022   • Acute on chronic renal insufficiency 2022   • Anxiety    • CKD (chronic kidney disease) stage 3, GFR 30-59 ml/min ([facility])    • Positive colorectal cancer screening using Cologuard test 2022      LOS (days): 0  Geometric Mean LOS (GMLOS) (days):   Days to GMLOS:     OBJECTIVE:    Risk of Unplanned Readmission Score: 11.91         Current admission status: Inpatient       Preferred Pharmacy:   [facility] [address] [facility], 10 [address] [place] [address] Drive  [address] [place], [place] [address] [address]  Phone: [phone] Fax: [phone]    Primary Care Provider: Rachel Miller DO    Primary Insurance: [facility] REP  Secondary Insurance:     ASSESSMENT:  [address] [facility] Representative - Sister, Legal Guardian   Primary Phone: [phone] (Mobile)               Advance Directives  Does patient have a [address]?: Yes  Does patient have Advance Directives?: Yes  Advance Directives: Living will, Power of  for health care  Primary Contact: Subhash Goes: sister-in-law: [phone]    Readmission Root Cause  30 Day Readmission: No    Patient Information  Admitted from[de-identified] Home  Mental Status: Alert  During Assessment patient was accompanied by: Not accompanied during assessment  Assessment information provided by[de-identified] Patient  Support Systems: Self, Family members  [place] of Residence: [address] [place] [address] Street do you live in?: [address] [facility] entry access options.  Select all that apply.: No steps to enter home  Type of Current Residence: 3 story home  Upon entering residence, is there a bedroom on the main floor (no further steps)?: No  A bedroom is located on the following floor levels of residence (select all that apply):: 2nd Floor  Upon entering residence, is there a bathroom on the main floor (no further steps)?: Yes  Number of steps to 2nd floor from main floor: One Flight  In the last 12 months, was there a time when you were not able to pay the mortgage or rent on time?: No  In the last 12 months, how many places have you lived?: 1  In the last 12 months, was there a time when you did not have a steady place to sleep or slept in a shelter (including now)?: No  Homeless/housing insecurity resource given?: N/A  Living Arrangements: Lives Alone  Is patient a ?: No    Activities of Daily Living Prior to Admission  Functional Status: Independent  Completes ADLs independently?: Yes  Ambulates independently?: Yes  Does patient use assisted devices?: No  Does patient currently own DME?: No  Does patient have a history of Outpatient Therapy (PT/OT)?: No  Does the patient have a history of Short-Term Rehab?: No  Does patient have a history of HHC?: No  Does patient currently have [facility]?: No    Patient Information Continued  Income Source: Pension/detention  Does patient have prescription coverage?: Yes  Within the past 12 months, you worried that your food would run out before you got the money to buy more.: Never true  Within the past 12 months, the food you bought just didn't last and you didn't have money to get more.: Never true  Food insecurity resource given?: N/A  Does patient receive dialysis treatments?: No  Does patient have a history of Mental Health Diagnosis?: Yes  Has patient received inpatient treatment related to mental health in the last 2 years?: No    Means of Transportation  Means of Transport to Appts[de-identified] Drives Self  In the past 12 months, has lack of transportation kept you from medical appointments or from getting medications?: No  In the past 12 months, has lack of transportation kept you from meetings, work, or from getting things needed for daily living?: No  Was application for public transport provided?: N/A        DISCHARGE DETAILS:    Discharge planning discussed with[de-identified] patient and patient's sister-in-law(Christine    Requested [address]         Is the patient interested in [address] at discharge?: Yes  [facility] requested[de-identified] Nursing, Medical Social Work  [facility] External Referral Reason (only applicable if external HHA name selected): Services not provided in network or near patient location  [address] Provider[de-identified] PCP  Home Health Services Needed[de-identified] Evaluate Functional Status and Safety, Other (comment)  Homebound Criteria Met[de-identified] Requires the Assistance of Another Person for Safe Ambulation or to Leave the Home  Supporting Clincal Findings[de-identified] Limited Endurance      Additional Comments: Met with Pt. Pt presents AA&Ox3. Discussed role of case management. Pt reports he lives alone in [facility] on 2nd floor. Denies hx of VNA/SNF/MH/D&A. Pt gave me permission to speak with sister-in-law(Christine: [phone]), Call placed to Pt's sister-in-law([place]), informed Pt's sister-in-law that PT/OT screened PT and PT can return home. Pt's sister-in-law inquired about home care nurse 2-3x/week and Meals on Wheels. CM informed Pt's sister-in-law that CM can setup home SN and MSW to assist with community resources. Pt's sister-in-law in agreement. Pt's sister-in-law informed that Pt will need transport home as Pt's sister-in-law lives in [place]. Call placed to [facility] on Aging([phone]), left message for referral line to call CM or Pt's sister-in-law back for Meals on Wheels information. [place] [facility] referral sent via [address]

## 2023-07-06 NOTE — ASSESSMENT & PLAN NOTE
Monitor on telemetry  EKG NSR, some t wave flattening. orthostatic vital signs positive. Most likely syncope 2/2 volume depletion. Pt states was not taking in adequate fluids. IVF with improvement of orthostasis  However still reports ongoing dizziness. Check MRI/echo.    PT/OT evals

## 2023-07-07 ENCOUNTER — APPOINTMENT (INPATIENT)
Dept: NON INVASIVE DIAGNOSTICS | Facility: HOSPITAL | Age: 78
DRG: 312 | End: 2023-07-07
Payer: COMMERCIAL

## 2023-07-07 LAB
ALBUMIN SERPL BCP-MCNC: 3.3 G/DL (ref 3.5–5)
ALP SERPL-CCNC: 49 U/L (ref 34–104)
ALT SERPL W P-5'-P-CCNC: 28 U/L (ref 7–52)
ANION GAP SERPL CALCULATED.3IONS-SCNC: 3 MMOL/L
AORTIC ROOT: 3.6 CM
AORTIC VALVE MEAN VELOCITY: 8.2 M/S
APICAL FOUR CHAMBER EJECTION FRACTION: 63 %
AST SERPL W P-5'-P-CCNC: 23 U/L (ref 13–39)
AV LVOT MEAN GRADIENT: 1 MMHG
AV LVOT PEAK GRADIENT: 3 MMHG
AV MEAN GRADIENT: 3 MMHG
AV PEAK GRADIENT: 6 MMHG
AV VELOCITY RATIO: 0.72
BASOPHILS # BLD AUTO: 0.04 THOUSANDS/ÂΜL (ref 0–0.1)
BASOPHILS NFR BLD AUTO: 1 % (ref 0–1)
BILIRUB SERPL-MCNC: 0.41 MG/DL (ref 0.2–1)
BUN SERPL-MCNC: 16 MG/DL (ref 5–25)
CALCIUM ALBUM COR SERPL-MCNC: 8.9 MG/DL (ref 8.3–10.1)
CALCIUM SERPL-MCNC: 8.3 MG/DL (ref 8.4–10.2)
CHLORIDE SERPL-SCNC: 114 MMOL/L (ref 96–108)
CO2 SERPL-SCNC: 24 MMOL/L (ref 21–32)
CREAT SERPL-MCNC: 1.26 MG/DL (ref 0.6–1.3)
DOP CALC AO PEAK VEL: 1.2 M/S
DOP CALC AO VTI: 26.11 CM
DOP CALC LVOT PEAK VEL VTI: 18.21 CM
DOP CALC LVOT PEAK VEL: 0.86 M/S
DOP CALC MV VTI: 31.7 CM
E WAVE DECELERATION TIME: 218 MS
EOSINOPHIL # BLD AUTO: 0.07 THOUSAND/ÂΜL (ref 0–0.61)
EOSINOPHIL NFR BLD AUTO: 1 % (ref 0–6)
ERYTHROCYTE [DISTWIDTH] IN BLOOD BY AUTOMATED COUNT: 12.3 % (ref 11.6–15.1)
FRACTIONAL SHORTENING: 25 % (ref 28–44)
GFR SERPL CREATININE-BSD FRML MDRD: 55 ML/MIN/1.73SQ M
GLUCOSE SERPL-MCNC: 90 MG/DL (ref 65–140)
HCT VFR BLD AUTO: 33.2 % (ref 36.5–49.3)
HGB BLD-MCNC: 10.6 G/DL (ref 12–17)
IMM GRANULOCYTES # BLD AUTO: 0.01 THOUSAND/UL (ref 0–0.2)
IMM GRANULOCYTES NFR BLD AUTO: 0 % (ref 0–2)
INTERVENTRICULAR SEPTUM IN DIASTOLE (PARASTERNAL SHORT AXIS VIEW): 0.9 CM
INTERVENTRICULAR SEPTUM: 0.9 CM (ref 0.6–1.1)
IVC: 24 MM
LAAS-AP2: 21.8 CM2
LAAS-AP4: 19.6 CM2
LEFT ATRIUM SIZE: 4 CM
LEFT ATRIUM VOLUME (MOD BIPLANE): 71 ML
LEFT INTERNAL DIMENSION IN SYSTOLE: 3.8 CM (ref 2.1–4)
LEFT VENTRICLE DIASTOLIC VOLUME (MOD BIPLANE): 112 ML
LEFT VENTRICLE SYSTOLIC VOLUME (MOD BIPLANE): 45 ML
LEFT VENTRICULAR INTERNAL DIMENSION IN DIASTOLE: 5.1 CM (ref 3.5–6)
LEFT VENTRICULAR POSTERIOR WALL IN END DIASTOLE: 0.7 CM
LEFT VENTRICULAR STROKE VOLUME: 64 ML
LV EF: 60 %
LVSV (TEICH): 64 ML
LYMPHOCYTES # BLD AUTO: 1.6 THOUSANDS/ÂΜL (ref 0.6–4.47)
LYMPHOCYTES NFR BLD AUTO: 27 % (ref 14–44)
MCH RBC QN AUTO: 32.1 PG (ref 26.8–34.3)
MCHC RBC AUTO-ENTMCNC: 31.9 G/DL (ref 31.4–37.4)
MCV RBC AUTO: 101 FL (ref 82–98)
MONOCYTES # BLD AUTO: 0.55 THOUSAND/ÂΜL (ref 0.17–1.22)
MONOCYTES NFR BLD AUTO: 9 % (ref 4–12)
MV E'TISSUE VEL-LAT: 11 CM/S
MV E'TISSUE VEL-SEP: 10 CM/S
MV MEAN GRADIENT: 1 MMHG
MV PEAK A VEL: 0.73 M/S
MV PEAK E VEL: 88 CM/S
MV PEAK GRADIENT: 5 MMHG
MV STENOSIS PRESSURE HALF TIME: 63 MS
MV VALVE AREA P 1/2 METHOD: 3.49 CM2
NEUTROPHILS # BLD AUTO: 3.7 THOUSANDS/ÂΜL (ref 1.85–7.62)
NEUTS SEG NFR BLD AUTO: 62 % (ref 43–75)
NRBC BLD AUTO-RTO: 0 /100 WBCS
PLATELET # BLD AUTO: 110 THOUSANDS/UL (ref 149–390)
PMV BLD AUTO: 11.2 FL (ref 8.9–12.7)
POTASSIUM SERPL-SCNC: 4.2 MMOL/L (ref 3.5–5.3)
PROT SERPL-MCNC: 5.3 G/DL (ref 6.4–8.4)
RA PRESSURE ESTIMATED: 8 MMHG
RBC # BLD AUTO: 3.3 MILLION/UL (ref 3.88–5.62)
RIGHT ATRIAL 2D VOLUME: 50 ML
RIGHT ATRIUM AREA SYSTOLE A4C: 17.1 CM2
RIGHT VENTRICLE ID DIMENSION: 3.8 CM
RV PSP: 38 MMHG
SL CV LEFT ATRIUM LENGTH A2C: 5.2 CM
SL CV LV EF: 60
SL CV PED ECHO LEFT VENTRICLE DIASTOLIC VOLUME (MOD BIPLANE) 2D: 124 ML
SL CV PED ECHO LEFT VENTRICLE SYSTOLIC VOLUME (MOD BIPLANE) 2D: 60 ML
SODIUM SERPL-SCNC: 141 MMOL/L (ref 135–147)
TR MAX PG: 30 MMHG
TR PEAK VELOCITY: 2.7 M/S
TRICUSPID ANNULAR PLANE SYSTOLIC EXCURSION: 2 CM
TRICUSPID VALVE PEAK REGURGITATION VELOCITY: 2.73 M/S
WBC # BLD AUTO: 5.97 THOUSAND/UL (ref 4.31–10.16)

## 2023-07-07 PROCEDURE — 80053 COMPREHEN METABOLIC PANEL: CPT | Performed by: HOSPITALIST

## 2023-07-07 PROCEDURE — 85025 COMPLETE CBC W/AUTO DIFF WBC: CPT | Performed by: HOSPITALIST

## 2023-07-07 PROCEDURE — 99232 SBSQ HOSP IP/OBS MODERATE 35: CPT | Performed by: HOSPITALIST

## 2023-07-07 PROCEDURE — 93306 TTE W/DOPPLER COMPLETE: CPT | Performed by: INTERNAL MEDICINE

## 2023-07-07 PROCEDURE — 93880 EXTRACRANIAL BILAT STUDY: CPT

## 2023-07-07 PROCEDURE — 93306 TTE W/DOPPLER COMPLETE: CPT

## 2023-07-07 PROCEDURE — 93880 EXTRACRANIAL BILAT STUDY: CPT | Performed by: SURGERY

## 2023-07-07 RX ORDER — MECLIZINE HCL 12.5 MG/1
25 TABLET ORAL EVERY 8 HOURS SCHEDULED
Status: COMPLETED | OUTPATIENT
Start: 2023-07-07 | End: 2023-07-08

## 2023-07-07 RX ADMIN — MECLIZINE 25 MG: 12.5 TABLET ORAL at 14:22

## 2023-07-07 RX ADMIN — HEPARIN SODIUM 5000 UNITS: 5000 INJECTION INTRAVENOUS; SUBCUTANEOUS at 06:43

## 2023-07-07 RX ADMIN — SODIUM CHLORIDE 150 ML/HR: 0.9 INJECTION, SOLUTION INTRAVENOUS at 09:32

## 2023-07-07 RX ADMIN — SODIUM CHLORIDE 150 ML/HR: 0.9 INJECTION, SOLUTION INTRAVENOUS at 02:16

## 2023-07-07 RX ADMIN — HEPARIN SODIUM 5000 UNITS: 5000 INJECTION INTRAVENOUS; SUBCUTANEOUS at 14:22

## 2023-07-07 RX ADMIN — MECLIZINE 25 MG: 12.5 TABLET ORAL at 21:02

## 2023-07-07 RX ADMIN — MECLIZINE 25 MG: 12.5 TABLET ORAL at 08:58

## 2023-07-07 RX ADMIN — HEPARIN SODIUM 5000 UNITS: 5000 INJECTION INTRAVENOUS; SUBCUTANEOUS at 21:02

## 2023-07-07 NOTE — PROGRESS NOTES
[facility]  Progress Note  Name: Shahana Hernandez  MRN: [id number]  Unit/Bed#: -01 I Date of Admission: 2023   Date of Service: 2023 I Hospital Day: 1    Assessment/Plan   CKD (chronic kidney disease) stage 3, GFR 30-59 ml/min [facility])  Assessment & Plan  Lab Results   Component Value Date    EGFR 55 2023    EGFR 53 2023    EGFR 37 2023    CREATININE 1.26 2023    CREATININE 1.29 2023    CREATININE 1.73 (H) 2023   Cr within baseline  Avoid nephrotoxins. * Syncope  Assessment & Plan  Monitor on telemetry  EKG NSR, some t wave flattening. orthostatic vital signs positive. Most likely syncope 2/2 volume depletion. Pt states was not taking in adequate fluids. IVF with improvement of orthostasis  However still reports ongoing dizziness. MRI no stroke  Echo pending  Check Carotid US  Start meclizine. VTE  Prophylaxis:   Pharmacologic: in place  Mechanical VTE Prophylaxis in Place: Yes    Patient Centered Rounds: I have performed bedside rounds with nursing staff today. Discussions with Specialists or Other Care Team Provider: case management    Education and Discussions with Family / Patient: pt      Current Length of Stay: 1 day(s)    Current Patient Status: Inpatient        Code Status: Level 1 - Full Code    Discharge Plan: Pt will require continued inpatient hospitalization. Subjective:   Pt without major improvement of lightheadedness  orthostasis has resolved. Patient is seen and examined at bedside. All other ROS are negative. Objective:     Vitals:   Temp (24hrs), Av.7 °F (36.5 °C), Min:97.4 °F (36.3 °C), Max:98.2 °F (36.8 °C)    Temp:  [97.4 °F (36.3 °C)-98.2 °F (36.8 °C)] 97.4 °F (36.3 °C)  HR:  [56-70] 56  Resp:  [16-18] 16  BP: (157-164)/(77-85) 157/77  SpO2:  [98 %-100 %] 100 %  Body mass index is 23.5 kg/m². Input and Output Summary (last 24 hours):        Intake/Output Summary (Last 24 hours) at 7/7/2023 1238  Last data filed at 7/7/2023 9915  Gross per 24 hour   Intake 4535 ml   Output --   Net 4535 ml       Physical Exam:     GEN: No acute distress, comfortable  HEEENT: No JVD, PERRLA, no scleral icterus  RESP: Lungs clear to auscultation bilaterally  CV: RRR, +s1/s2   ABD: SOFT NON TENDER, POSITIVE BOWEL SOUNDS, NO DISTENTION  PSYCH: CALM  NEURO: Mentation baseline, NO FOCAL DEFICITS  SKIN: NO RASH  EXTREM: NO EDEMA    Additional Data:     Labs:    Results from last 7 days   Lab Units 07/07/23  0400   WBC Thousand/uL 5.97   HEMOGLOBIN g/dL 10.6*   HEMATOCRIT % 33.2*   PLATELETS Thousands/uL 110*   NEUTROS PCT % 62   LYMPHS PCT % 27   MONOS PCT % 9   EOS PCT % 1     Results from last 7 days   Lab Units 07/07/23  0400   SODIUM mmol/L 141   POTASSIUM mmol/L 4.2   CHLORIDE mmol/L 114*   CO2 mmol/L 24   BUN mg/dL 16   CREATININE mg/dL 1.26   ANION GAP mmol/L 3   CALCIUM mg/dL 8.3*   ALBUMIN g/dL 3.3*   TOTAL BILIRUBIN mg/dL 0.41   ALK PHOS U/L 49   ALT U/L 28   AST U/L 23   GLUCOSE RANDOM mg/dL 90                       Lines/Drains:  Invasive Devices     Peripheral Intravenous Line  Duration           Peripheral IV 07/06/23 Left;Ventral (anterior) Forearm <1 day                Telemetry:   Telemetry Orders (From admission, onward)             24 Hour Telemetry Monitoring  (ED Bridging Orders Panel)  Continuous x 24 Hours (Telem)        Expiring   Question:  Reason for 24 Hour Telemetry  Answer:  Syncope suspected to be cardiac in origin                    * I Have Reviewed All Lab Data Listed Above. Imaging:     Results for orders placed during the hospital encounter of 07/05/23    XR chest 1 view portable    Narrative  CHEST    INDICATION:   near syncope. COMPARISON: CXR 5/9/2022 and abdomen CT 5/14/2022. EXAM PERFORMED/VIEWS:  XR CHEST PORTABLE. FINDINGS:    Cardiomediastinal silhouette normal.    Lungs clear. No effusion or pneumothorax.     Upper abdomen normal. Bones normal for age. Impression  No acute cardiopulmonary disease. Workstation performed: [id number]    Results for orders placed during the hospital encounter of 05/09/22    XR chest pa & lateral    Narrative  CHEST    INDICATION:   K40.90: Unilateral inguinal hernia, without obstruction or gangrene, not specified as recurrent. COMPARISON:  None    EXAM PERFORMED/VIEWS:  XR CHEST PA & LATERAL  DUAL ENERGY SUBTRACTION. FINDINGS:    Cardiomediastinal silhouette appears unremarkable. The lungs are clear. No pneumothorax or pleural effusion. Osseous structures appear within normal limits for patient age. Impression  No acute cardiopulmonary disease. Workstation performed: [id number]      *I have reviewed all imaging reports listed above      Recent Cultures (last 7 days):           Last 24 Hours Medication List:   Current Facility-Administered Medications   Medication Dose Route Frequency Provider Last Rate   • acetaminophen  975 mg Oral Q8H PRN Priyanka Pereira MD     • docusate sodium  100 mg Oral BID Priyanka Pereira MD     • heparin (porcine)  5,000 Units Subcutaneous [facility] Priyanka Pereira MD     • meclizine  25 mg Oral [facility] Priyanka Pereira MD     • polyethylene glycol  17 g Oral Daily Priyanka Pereira MD     • senna  1 tablet Oral Daily Priyanka Pereira MD          Today, Patient Was Seen By: Priyanka Pereira MD    ** Please Note: Dictation voice to text software may have been used in the creation of this document.  **

## 2023-07-07 NOTE — ASSESSMENT & PLAN NOTE
Lab Results   Component Value Date    EGFR 55 07/07/2023    EGFR 53 07/06/2023    EGFR 37 07/05/2023    CREATININE 1.26 07/07/2023    CREATININE 1.29 07/06/2023    CREATININE 1.73 (H) 07/05/2023   Cr within baseline  Avoid nephrotoxins.

## 2023-07-07 NOTE — UTILIZATION REVIEW
NOTIFICATION OF INPATIENT ADMISSION   AUTHORIZATION REQUEST   SERVICING FACILITY:   [address] [place] [address]  [address] [facility],Third Floor [address]  Tax ID: [id number]  NPI: [id number] ATTENDING PROVIDER:  Attending Name and NPI#: Jake Reyes [[id number]]  Address: [facility] [address] [facility],Third Floor [address]  Phone: [phone]   ADMISSION INFORMATION:  Place of Service: [address] [place] [address]  Place of Service Code: 21  Inpatient Admission Date/Time: 7/6/23 11:42 AM  Discharge Date/Time: No discharge date for patient encounter. Admitting Diagnosis Code/Description:  Orthostatic hypotension [I95.1]  Dehydration [E86.0]  Dizziness [R42]  Lightheadedness [R42]  Syncope [R55]     UTILIZATION REVIEW CONTACT:  Ayala Mabry Utilization   Network Utilization Review Department  Phone: [phone]  Fax [phone]  Email: [email or web address]  Contact for approvals/pending authorizations, clinical reviews, and discharge. PHYSICIAN ADVISORY SERVICES:  Medical Necessity Denial & Lfrd-ie-Bdby Review  Phone: [phone]  Fax: [phone]  Email: [email or web address]

## 2023-07-07 NOTE — PLAN OF CARE
Problem: CARDIOVASCULAR - ADULT  Goal: Maintains optimal cardiac output and hemodynamic stability  Description: INTERVENTIONS:  - Monitor I/O, vital signs and rhythm  - Monitor for S/S and trends of decreased cardiac output  - Administer and titrate ordered vasoactive medications to optimize hemodynamic stability  - Assess quality of pulses, skin color and temperature  - Assess for signs of decreased coronary artery perfusion  - Instruct patient to report change in severity of symptoms  Outcome: Progressing     Problem: GASTROINTESTINAL - ADULT  Goal: Maintains adequate nutritional intake  Description: INTERVENTIONS:  - Monitor percentage of each meal consumed  - Identify factors contributing to decreased intake, treat as appropriate  - Assist with meals as needed  - Monitor I&O, weight, and lab values if indicated  - Obtain nutrition services referral as needed  Outcome: Progressing     Problem: METABOLIC, FLUID AND ELECTROLYTES - ADULT  Goal: Fluid balance maintained  Description: INTERVENTIONS:  - Monitor labs   - Monitor I/O and WT  - Instruct patient on fluid and nutrition as appropriate  - Assess for signs & symptoms of volume excess or deficit  Outcome: Progressing     Problem: SAFETY ADULT  Goal: Patient will remain free of falls  Description: INTERVENTIONS:  - Educate patient/family on patient safety including physical limitations  - Instruct patient to call for assistance with activity   - Consult OT/PT to assist with strengthening/mobility   - Keep Call bell within reach  - Keep bed low and locked with side rails adjusted as appropriate  - Keep care items and personal belongings within reach  - Initiate and maintain comfort rounds  - Make Fall Risk Sign visible to staff  - Offer Toileting every  Hours, in advance of need  - Initiate/Maintain alarm  - Obtain necessary fall risk management equipment:   - Apply yellow socks and bracelet for high fall risk patients  - Consider moving patient to room near nurses station  Outcome: Progressing  Goal: Maintain or return to baseline ADL function  Description: INTERVENTIONS:  -  Assess patient's ability to carry out ADLs; assess patient's baseline for ADL function and identify physical deficits which impact ability to perform ADLs (bathing, care of mouth/teeth, toileting, grooming, dressing, etc.)  - Assess/evaluate cause of self-care deficits   - Assess range of motion  - Assess patient's mobility; develop plan if impaired  - Assess patient's need for assistive devices and provide as appropriate  - Encourage maximum independence but intervene and supervise when necessary  - Involve family in performance of ADLs  - Assess for home care needs following discharge   - Consider OT consult to assist with ADL evaluation and planning for discharge  - Provide patient education as appropriate  Outcome: Progressing  Goal: Maintains/Returns to pre admission functional level  Description: INTERVENTIONS:  - Perform BMAT or MOVE assessment daily.   - Set and communicate daily mobility goal to care team and patient/family/caregiver. - Collaborate with rehabilitation services on mobility goals if consulted  - Perform Range of Motion  times a day. - Reposition patient every  hours.   - Dangle patient  times a day  - Stand patient  times a day  - Ambulate patient  times a day  - Out of bed to chair  times a day   - Out of bed for meals times a day  - Out of bed for toileting  - Record patient progress and toleration of activity level   Outcome: Progressing     Problem: DISCHARGE PLANNING  Goal: Discharge to home or other facility with appropriate resources  Description: INTERVENTIONS:  - Identify barriers to discharge w/patient and caregiver  - Arrange for needed discharge resources and transportation as appropriate  - Identify discharge learning needs (meds, wound care, etc.)  - Arrange for interpretive services to assist at discharge as needed  - Refer to Case Management Department for coordinating discharge planning if the patient needs post-hospital services based on physician/advanced practitioner order or complex needs related to functional status, cognitive ability, or social support system  Outcome: Progressing     Problem: Knowledge Deficit  Goal: Patient/family/caregiver demonstrates understanding of disease process, treatment plan, medications, and discharge instructions  Description: Complete learning assessment and assess knowledge base.   Interventions:  - Provide teaching at level of understanding  - Provide teaching via preferred learning methods  Outcome: Progressing

## 2023-07-07 NOTE — CASE MANAGEMENT
Case Management Discharge Planning Note    Patient name Pat [place]  Location /-12 MRN [id number]  : 1945 Date 2023       Current Admission Date: 2023  Current Admission Diagnosis:Syncope   Patient Active Problem List    Diagnosis Date Noted   • Syncope 2023   • H/O bilateral inguinal hernia repair 2022   • Acute on chronic renal insufficiency 2022   • Anxiety    • CKD (chronic kidney disease) stage 3, GFR 30-59 ml/min ([facility])    • Positive colorectal cancer screening using Cologuard test 2022      LOS (days): 1  Geometric Mean LOS (GMLOS) (days): 2.30  Days to GMLOS:1.1     OBJECTIVE:  Risk of Unplanned Readmission Score: 12.1         Current admission status: Inpatient   Preferred Pharmacy:   [address] [place] [address] [place] [address]  Phone: [phone] Fax: [phone]    Primary Care Provider: Emilie Butcher DO    Primary Insurance: [facility] REP  Secondary Insurance:     DISCHARGE DETAILS:    IMM Given (Date):: 23  IMM Given to[de-identified] Patient     Additional Comments: Met with Pt. Confirmed with Pt that [facility] will start services within 48hours of discharge. CM informed Pt that ride can be arranged if he does not have ride home. Pt reports he has keys and will be able to get into his house.

## 2023-07-07 NOTE — ASSESSMENT & PLAN NOTE
Monitor on telemetry  EKG NSR, some t wave flattening. orthostatic vital signs positive. Most likely syncope 2/2 volume depletion. Pt states was not taking in adequate fluids. IVF with improvement of orthostasis  However still reports ongoing dizziness. MRI no stroke  Echo pending  Check Carotid US  Start meclizine.

## 2023-07-08 VITALS
SYSTOLIC BLOOD PRESSURE: 143 MMHG | BODY MASS INDEX: 23.5 KG/M2 | TEMPERATURE: 98.1 F | HEART RATE: 59 BPM | OXYGEN SATURATION: 99 % | RESPIRATION RATE: 18 BRPM | DIASTOLIC BLOOD PRESSURE: 79 MMHG | HEIGHT: 72 IN | WEIGHT: 173.5 LBS

## 2023-07-08 LAB
ALBUMIN SERPL BCP-MCNC: 3.6 G/DL (ref 3.5–5)
ALP SERPL-CCNC: 52 U/L (ref 34–104)
ALT SERPL W P-5'-P-CCNC: 29 U/L (ref 7–52)
ANION GAP SERPL CALCULATED.3IONS-SCNC: 2 MMOL/L
AST SERPL W P-5'-P-CCNC: 23 U/L (ref 13–39)
BASOPHILS # BLD AUTO: 0.03 THOUSANDS/ÂΜL (ref 0–0.1)
BASOPHILS NFR BLD AUTO: 1 % (ref 0–1)
BILIRUB SERPL-MCNC: 0.45 MG/DL (ref 0.2–1)
BUN SERPL-MCNC: 21 MG/DL (ref 5–25)
CALCIUM SERPL-MCNC: 8.5 MG/DL (ref 8.4–10.2)
CHLORIDE SERPL-SCNC: 113 MMOL/L (ref 96–108)
CO2 SERPL-SCNC: 25 MMOL/L (ref 21–32)
CREAT SERPL-MCNC: 1.42 MG/DL (ref 0.6–1.3)
EOSINOPHIL # BLD AUTO: 0.1 THOUSAND/ÂΜL (ref 0–0.61)
EOSINOPHIL NFR BLD AUTO: 2 % (ref 0–6)
ERYTHROCYTE [DISTWIDTH] IN BLOOD BY AUTOMATED COUNT: 12.7 % (ref 11.6–15.1)
GFR SERPL CREATININE-BSD FRML MDRD: 47 ML/MIN/1.73SQ M
GLUCOSE SERPL-MCNC: 91 MG/DL (ref 65–140)
HCT VFR BLD AUTO: 35.7 % (ref 36.5–49.3)
HGB BLD-MCNC: 11.5 G/DL (ref 12–17)
IMM GRANULOCYTES # BLD AUTO: 0.02 THOUSAND/UL (ref 0–0.2)
IMM GRANULOCYTES NFR BLD AUTO: 0 % (ref 0–2)
LYMPHOCYTES # BLD AUTO: 1.45 THOUSANDS/ÂΜL (ref 0.6–4.47)
LYMPHOCYTES NFR BLD AUTO: 26 % (ref 14–44)
MCH RBC QN AUTO: 32.7 PG (ref 26.8–34.3)
MCHC RBC AUTO-ENTMCNC: 32.2 G/DL (ref 31.4–37.4)
MCV RBC AUTO: 101 FL (ref 82–98)
MONOCYTES # BLD AUTO: 0.46 THOUSAND/ÂΜL (ref 0.17–1.22)
MONOCYTES NFR BLD AUTO: 8 % (ref 4–12)
NEUTROPHILS # BLD AUTO: 3.58 THOUSANDS/ÂΜL (ref 1.85–7.62)
NEUTS SEG NFR BLD AUTO: 63 % (ref 43–75)
NRBC BLD AUTO-RTO: 0 /100 WBCS
PLATELET # BLD AUTO: 118 THOUSANDS/UL (ref 149–390)
PMV BLD AUTO: 11.3 FL (ref 8.9–12.7)
POTASSIUM SERPL-SCNC: 4.3 MMOL/L (ref 3.5–5.3)
PROT SERPL-MCNC: 5.8 G/DL (ref 6.4–8.4)
RBC # BLD AUTO: 3.52 MILLION/UL (ref 3.88–5.62)
SODIUM SERPL-SCNC: 140 MMOL/L (ref 135–147)
WBC # BLD AUTO: 5.64 THOUSAND/UL (ref 4.31–10.16)

## 2023-07-08 PROCEDURE — 85025 COMPLETE CBC W/AUTO DIFF WBC: CPT | Performed by: HOSPITALIST

## 2023-07-08 PROCEDURE — 99239 HOSP IP/OBS DSCHRG MGMT >30: CPT | Performed by: HOSPITALIST

## 2023-07-08 PROCEDURE — 80053 COMPREHEN METABOLIC PANEL: CPT | Performed by: HOSPITALIST

## 2023-07-08 RX ORDER — MECLIZINE HYDROCHLORIDE 25 MG/1
25 TABLET ORAL EVERY 8 HOURS PRN
Qty: 30 TABLET | Refills: 0 | Status: SHIPPED | OUTPATIENT
Start: 2023-07-08

## 2023-07-08 RX ADMIN — SENNOSIDES 8.6 MG: 8.6 TABLET, FILM COATED ORAL at 09:38

## 2023-07-08 RX ADMIN — MECLIZINE 25 MG: 12.5 TABLET ORAL at 13:15

## 2023-07-08 RX ADMIN — MECLIZINE 25 MG: 12.5 TABLET ORAL at 07:20

## 2023-07-08 RX ADMIN — POLYETHYLENE GLYCOL 3350 17 G: 17 POWDER, FOR SOLUTION ORAL at 09:38

## 2023-07-08 RX ADMIN — DOCUSATE SODIUM 100 MG: 100 CAPSULE, LIQUID FILLED ORAL at 09:38

## 2023-07-08 RX ADMIN — HEPARIN SODIUM 5000 UNITS: 5000 INJECTION INTRAVENOUS; SUBCUTANEOUS at 13:16

## 2023-07-08 RX ADMIN — HEPARIN SODIUM 5000 UNITS: 5000 INJECTION INTRAVENOUS; SUBCUTANEOUS at 07:20

## 2023-07-08 NOTE — ASSESSMENT & PLAN NOTE
Lab Results   Component Value Date    EGFR 47 07/08/2023    EGFR 55 07/07/2023    EGFR 53 07/06/2023    CREATININE 1.42 (H) 07/08/2023    CREATININE 1.26 07/07/2023    CREATININE 1.29 07/06/2023   Cr within baseline  Avoid nephrotoxins.

## 2023-07-08 NOTE — ASSESSMENT & PLAN NOTE
Monitor on telemetry  EKG NSR, some t wave flattening. orthostatic vital signs positive. Most likely syncope 2/2 volume depletion. Pt states was not taking in adequate fluids. IVF with improvement of orthostasis  MRI no stroke  Echo normal.   Carotid US. Left side no stenosis, R side < 50 % stenosis. Improved with meclizine. May have component of vertigo as well. Outpt f/u with ENT.

## 2023-07-08 NOTE — PLAN OF CARE
Problem: SAFETY ADULT  Goal: Patient will remain free of falls  Description: INTERVENTIONS:  - Educate patient/family on patient safety including physical limitations  - Instruct patient to call for assistance with activity   - Consult OT/PT to assist with strengthening/mobility   - Keep Call bell within reach  - Keep bed low and locked with side rails adjusted as appropriate  - Keep care items and personal belongings within reach  - Initiate and maintain comfort rounds  - Make Fall Risk Sign visible to staff  - Offer Toileting every 2 Hours, in advance of need  - Initiate/Maintain bed alarm  - Obtain necessary fall risk management equipment: yellow socks, yellow bracelet, bed alarm  - Apply yellow socks and bracelet for high fall risk patients  - Consider moving patient to room near nurses station  Outcome: Progressing

## 2023-07-08 NOTE — CASE MANAGEMENT
Case Management Discharge Planning Note    Patient name Yazan Lean  Location /-57 MRN [id number]  : 1945 Date 2023       Current Admission Date: 2023  Current Admission Diagnosis:Syncope   Patient Active Problem List    Diagnosis Date Noted   • Syncope 2023   • H/O bilateral inguinal hernia repair 2022   • Acute on chronic renal insufficiency 2022   • Anxiety    • CKD (chronic kidney disease) stage 3, GFR 30-59 ml/min ([facility])    • Positive colorectal cancer screening using Cologuard test 2022      LOS (days): 2  Geometric Mean LOS (GMLOS) (days): 2.30  Days to GMLOS:0.3     OBJECTIVE:  Risk of Unplanned Readmission Score: 11.21         Current admission status: Inpatient   Preferred Pharmacy:   [address] [place] [address] [id number] [place] [address], [address] [place] [address] Drive  [address] [place] [address]  Phone: [phone] Fax: [phone]    Primary Care Provider: Yosvany Rogers DO    Primary Insurance: [address]  Secondary Insurance:     DISCHARGE DETAILS:    Additional Comments: Flukleuc[employer] wheelchair Julio Arturo transport Pt to Pt's home address at [place] [address] in [place]/ [facility] is 2:30pm. Pt in agreement for wheelchair [employer] transport and CM informed of transport time. Faxed discharge orders, MD progress note and discharge summary to [phone].

## 2023-07-08 NOTE — DISCHARGE SUMMARY
[facility]  Discharge- Cedrick Simons 1945, 78 y.o. male MRN: [id number]  Unit/Bed#: -01 Encounter: [id number]  Primary Care Provider: Nicole Saavedra DO   Date and time admitted to hospital: 7/5/2023  1:11 PM    CKD (chronic kidney disease) stage 3, GFR 30-59 ml/min [facility])  Assessment & Plan  Lab Results   Component Value Date    EGFR 47 07/08/2023    EGFR 55 07/07/2023    EGFR 53 07/06/2023    CREATININE 1.42 (H) 07/08/2023    CREATININE 1.26 07/07/2023    CREATININE 1.29 07/06/2023   Cr within baseline  Avoid nephrotoxins. * Syncope  Assessment & Plan  Monitor on telemetry  EKG NSR, some t wave flattening. orthostatic vital signs positive. Most likely syncope 2/2 volume depletion. Pt states was not taking in adequate fluids. IVF with improvement of orthostasis  MRI no stroke  Echo normal.   Carotid US. Left side no stenosis, R side < 50 % stenosis. Improved with meclizine. May have component of vertigo as well. Outpt f/u with ENT. Hospital Course:     Cedrick Simons is a 78 y.o. male patient who originally presented to the hospital on   Admission Orders (From admission, onward)     Ordered        07/06/23 1141  Inpatient Admission  Once            07/05/23 1551  Place in Observation  Once                     due to orthostatic syncope. He had extensive work-up here including echo, MRI and carotid ultrasound. These did not have any concern for being positive for syncope. His orthostasis resolved with hydration. He did have significant improvement here in the hospital.  He was cleared by physical therapy but will have home visiting health services. We have given him an outpatient ENT referral in case there is a component of peripheral vertigo as well as a brief course of as needed meclizine    Please see above list of diagnoses and related plan for additional information.      Physical Exam:    GEN: No acute distress, comfortable  HEEENT: No JVD, PERRLA, no scleral icterus  RESP: Lungs clear to auscultation bilaterally  CV: RRR, +s1/s2   ABD: SOFT NON TENDER, POSITIVE BOWEL SOUNDS, NO DISTENTION  PSYCH: CALM  NEURO: Mentation baseline, NO FOCAL DEFICITS  SKIN: NO RASH  EXTREM: NO EDEMA    CONSULTING PROVIDERS   None    PROCEDURES PERFORMED  * No surgery found *    RADIOLOGY RESULTS  VAS carotid complete study    Result Date: 7/7/2023  Narrative:  [facility] REPORT CLINICAL: Indications:  Patient presents with two recent episodes of dizziness / near syncope lasting approximately 5 minutes. He is currently asymptomatic. Operative History: no reported cardiovascular surgeries Risk Factors The patient has history of Hyperlipidemia and previous smoking (quit >10yrs ago). Clinical Right Pressure:  157/77 mm Hg, Left Pressure: IV  FINDINGS:  Right        Impression  PSV  EDV (cm/s)  Direction of Flow  Ratio  Dist. ICA                 38           8                      0.58  Mid. ICA                  52          13                      0.79  Prox. ICA    1 - 49%     113          29                      1.71  Dist CCA                  52          12                            Mid CCA                   66          15                      1.04  Prox CCA                  63          11                            Ext Carotid               82           3                      1.24  Prox Vert                 33           7  Antegrade                 Subclavian                65           0                             Left         Impression  PSV  EDV (cm/s)  Direction of Flow  Ratio  Dist. ICA                 43          13                      0.62  Mid. ICA                  47          15                      0.68  Prox.  ICA    Normal       62          17                      0.90  Dist CCA                  66          16                            Mid CCA                   69          13                      1.21  Prox CCA                  57           9 Ext Carotid               76           7                      1.10  Prox Vert                 42          11  Antegrade                 Subclavian                92           0                               CONCLUSION:  Impression RIGHT: There is <50% stenosis noted in the internal carotid artery. Plaque is calcified, heterogenous and irregular. Vertebral artery flow is antegrade. There is no significant subclavian artery disease. LEFT: There is no evidence of arterial disease throughout the extracranial carotid system. Vertebral artery flow is antegrade. There is no significant subclavian artery disease. There is no previous study for comparison. SIGNATURE: Electronically Signed by: Medhat Hammond on 2023-07-07 06:18:53 PM    Echo complete w/ contrast if indicated    Result Date: 7/7/2023  Narrative: •  Left Ventricle: Left ventricular cavity size is normal. Wall thickness is normal. The left ventricular ejection fraction is 60% by biplane measurement. Systolic function is normal. Wall motion is normal. Diastolic function is normal. •  Right Ventricle: Right ventricular cavity size is normal. Systolic function is normal. •  Left Atrium: The atrium is mildly dilated (35-41 mL/m2). •  Right Atrium: The atrium is normal in size. •  Mitral Valve: There is mild to moderate regurgitation. •  Tricuspid Valve: The right ventricular systolic pressure is mildly elevated. The estimated right ventricular systolic pressure is 70.10 mmHg. No prior study available for direct comparison. MRI brain wo contrast    Result Date: 7/6/2023  Narrative: MRI BRAIN WITHOUT CONTRAST INDICATION: stroke like conditions. COMPARISON:   None. TECHNIQUE:  Multiplanar, multisequence imaging of the brain was performed. IMAGE QUALITY:  Diagnostic. FINDINGS: BRAIN PARENCHYMA:  There is no discrete mass, mass effect or midline shift. There is no intracranial hemorrhage.   There is no evidence of acute infarction and diffusion imaging is unremarkable. Small scattered hyperintensities on T2/FLAIR imaging are noted in the periventricular and subcortical white matter demonstrating an appearance that is statistically most likely to represent mild microangiopathic change. VENTRICLES:  Normal for the patient's age. SELLA AND PITUITARY GLAND:  Normal. ORBITS:  Normal. PARANASAL SINUSES:  Normal. VASCULATURE:  Evaluation of the major intracranial vasculature demonstrates appropriate flow voids. CALVARIUM AND SKULL BASE:  Normal. EXTRACRANIAL SOFT TISSUES:  Normal.     Impression: No MR evidence of acute ischemia. Chronic microangiopathy. Workstation performed: [id number]     XR chest 1 view portable    Result Date: 7/5/2023  Narrative: CHEST INDICATION:   near syncope. COMPARISON: CXR 5/9/2022 and abdomen CT 5/14/2022. EXAM PERFORMED/VIEWS:  XR CHEST PORTABLE. FINDINGS: Cardiomediastinal silhouette normal. Lungs clear. No effusion or pneumothorax. Upper abdomen normal. Bones normal for age. Impression: No acute cardiopulmonary disease.  Workstation performed: [id number]       LABS  Results from last 7 days   Lab Units 07/08/23  0408 07/07/23  0400 07/06/23  0545 07/05/23  1340   WBC Thousand/uL 5.64 5.97 6.13 6.47   HEMOGLOBIN g/dL 11.5* 10.6* 11.7* 11.3*   HEMATOCRIT % 35.7* 33.2* 35.8* 34.7*   MCV fL 101* 101* 99* 101*   PLATELETS Thousands/uL 118* 110* 116* 140*     Results from last 7 days   Lab Units 07/08/23  0408 07/07/23  0400 07/06/23  0545 07/05/23  1340   SODIUM mmol/L 140 141 139 139   POTASSIUM mmol/L 4.3 4.2 4.0 4.6   CHLORIDE mmol/L 113* 114* 114* 111*   CO2 mmol/L 25 24 19* 22   BUN mg/dL 21 16 21 28*   CREATININE mg/dL 1.42* 1.26 1.29 1.73*   CALCIUM mg/dL 8.5 8.3* 8.2* 8.7   ALBUMIN g/dL 3.6 3.3* 3.7 3.8   TOTAL BILIRUBIN mg/dL 0.45 0.41 0.61 0.54   ALK PHOS U/L 52 49 57 56   ALT U/L 29 28 37 23   AST U/L 23 23 39 25   EGFR ml/min/1.73sq m 47 55 53 37   GLUCOSE RANDOM mg/dL 91 90 93 172*     Results from last 7 days Lab Units 07/05/23  1809 07/05/23  1627 07/05/23  1340   HS TNI 0HR ng/L  --   --  6   HS TNI 2HR ng/L  --  7  --    HS TNI 4HR ng/L 7  --   --                                     Cultures:   Results from last 7 days   Lab Units 07/05/23  1601   COLOR UA  Yellow   CLARITY UA  Clear   SPEC GRAV UA  1.025   PH UA  5.0   LEUKOCYTES UA  Negative   NITRITE UA  Negative   GLUCOSE UA mg/dl Negative   KETONES UA mg/dl Negative   BILIRUBIN UA  Negative   BLOOD UA  Negative      Results from last 7 days   Lab Units 07/05/23  1601   RBC UA /hpf None Seen   WBC UA /hpf 0-1   EPITHELIAL CELLS WET PREP /hpf Occasional   BACTERIA UA /hpf Occasional              Condition at Discharge:  good      Discharge instructions/Information to patient and family:   See after visit summary for information provided to patient and family. Provisions for Follow-Up Care:  See after visit summary for information related to follow-up care and any pertinent home health orders. Disposition:     Home       Discharge Statement:  I spent 39 minutes discharging the patient. This time was spent on the day of discharge. I had direct contact with the patient on the day of discharge. Greater than 50% of the total time was spent examining patient, answering all patient questions, arranging and discussing plan of care with patient as well as directly providing post-discharge instructions. Additional time then spent on discharge activities. Discharge Medications:  See after visit summary for reconciled discharge medications provided to patient and family.       ** Please Note: This note has been constructed using a voice recognition system **

## 2023-07-08 NOTE — PLAN OF CARE
Problem: CARDIOVASCULAR - ADULT  Goal: Maintains optimal cardiac output and hemodynamic stability  Description: INTERVENTIONS:  - Monitor I/O, vital signs and rhythm  - Monitor for S/S and trends of decreased cardiac output  - Administer and titrate ordered vasoactive medications to optimize hemodynamic stability  - Assess quality of pulses, skin color and temperature  - Assess for signs of decreased coronary artery perfusion  - Instruct patient to report change in severity of symptoms  Outcome: Progressing     Problem: GASTROINTESTINAL - ADULT  Goal: Maintains adequate nutritional intake  Description: INTERVENTIONS:  - Monitor percentage of each meal consumed  - Identify factors contributing to decreased intake, treat as appropriate  - Assist with meals as needed  - Monitor I&O, weight, and lab values if indicated  - Obtain nutrition services referral as needed  Outcome: Progressing     Problem: METABOLIC, FLUID AND ELECTROLYTES - ADULT  Goal: Fluid balance maintained  Description: INTERVENTIONS:  - Monitor labs   - Monitor I/O and WT  - Instruct patient on fluid and nutrition as appropriate  - Assess for signs & symptoms of volume excess or deficit  Outcome: Progressing     Problem: SAFETY ADULT  Goal: Patient will remain free of falls  Description: INTERVENTIONS:  - Educate patient/family on patient safety including physical limitations  - Instruct patient to call for assistance with activity   - Consult OT/PT to assist with strengthening/mobility   - Keep Call bell within reach  - Keep bed low and locked with side rails adjusted as appropriate  - Keep care items and personal belongings within reach  - Initiate and maintain comfort rounds  - Make Fall Risk Sign visible to staff  - Offer Toileting every 2 Hours, in advance of need  - Initiate/Maintain bed alarm  - Obtain necessary fall risk management equipment: yellow socks, yellow bracelet, bed alarm  - Apply yellow socks and bracelet for high fall risk patients  - Consider moving patient to room near nurses station  Outcome: Progressing  Goal: Maintain or return to baseline ADL function  Description: INTERVENTIONS:  -  Assess patient's ability to carry out ADLs; assess patient's baseline for ADL function and identify physical deficits which impact ability to perform ADLs (bathing, care of mouth/teeth, toileting, grooming, dressing, etc.)  - Assess/evaluate cause of self-care deficits   - Assess range of motion  - Assess patient's mobility; develop plan if impaired  - Assess patient's need for assistive devices and provide as appropriate  - Encourage maximum independence but intervene and supervise when necessary  - Involve family in performance of ADLs  - Assess for home care needs following discharge   - Consider OT consult to assist with ADL evaluation and planning for discharge  - Provide patient education as appropriate  Outcome: Progressing  Goal: Maintains/Returns to pre admission functional level  Description: INTERVENTIONS:  - Perform BMAT or MOVE assessment daily.   - Set and communicate daily mobility goal to care team and patient/family/caregiver. - Collaborate with rehabilitation services on mobility goals if consulted  - Perform Range of Motion 5 times a day. - Reposition patient every 2 hours.   - Dangle patient 5 times a day  - Stand patient 5 times a day  - Ambulate patient 5 times a day  - Out of bed to chair 3 times a day   - Out of bed for meals 3 times a day  - Out of bed for toileting  - Record patient progress and toleration of activity level   Outcome: Progressing     Problem: DISCHARGE PLANNING  Goal: Discharge to home or other facility with appropriate resources  Description: INTERVENTIONS:  - Identify barriers to discharge w/patient and caregiver  - Arrange for needed discharge resources and transportation as appropriate  - Identify discharge learning needs (meds, wound care, etc.)  - Arrange for interpretive services to assist at discharge as needed  - Refer to Case Management Department for coordinating discharge planning if the patient needs post-hospital services based on physician/advanced practitioner order or complex needs related to functional status, cognitive ability, or social support system  Outcome: Progressing     Problem: Knowledge Deficit  Goal: Patient/family/caregiver demonstrates understanding of disease process, treatment plan, medications, and discharge instructions  Description: Complete learning assessment and assess knowledge base.   Interventions:  - Provide teaching at level of understanding  - Provide teaching via preferred learning methods  Outcome: Progressing

## 2023-07-10 NOTE — UTILIZATION REVIEW
NOTIFICATION OF ADMISSION DISCHARGE   This is a Notification of Discharge from [facility]. Please be advised that this patient has been discharge from our facility. Below you will find the admission and discharge date and time including the patient’s disposition. UTILIZATION REVIEW CONTACT:  Darya Knapp  Utilization   Network Utilization Review Department  Phone: [phone] carefully listen to the prompts. All voicemails are confidential.  Email: [email or web address]     ADMISSION INFORMATION  PRESENTATION DATE: 7/5/2023  1:11 PM  OBERVATION ADMISSION DATE:   INPATIENT ADMISSION DATE: 7/6/23 11:42 AM   DISCHARGE DATE: 7/8/2023  4:18 PM   DISPOSITION:Home/Self Care    IMPORTANT INFORMATION:  Send all requests for admission clinical reviews, approved or denied determinations and any other requests to dedicated fax number below belonging to the campus where the patient is receiving treatment.  List of dedicated fax numbers:  [place] DENIALS (Administrative/Medical Necessity) [phone] [address] [facility] (Maternity/NICU/Pediatrics) [phone]   [facility] [phone]   [facility] [phone] [address] [facility] [phone]   [address] [place] [address] [phone]   [facility] [phone]   [address] [place] [address] [address] [place] [address] [phone]   [address] [place] [address] [phone] [address] [phone] [address] [facility] [address] [phone]

## 2024-06-12 ENCOUNTER — LAB REQUISITION (OUTPATIENT)
Dept: LAB | Facility: HOSPITAL | Age: 79
End: 2024-06-12

## 2024-06-12 DIAGNOSIS — R41.0 DISORIENTATION, UNSPECIFIED: ICD-10-CM

## 2024-06-12 LAB
BACTERIA UR QL AUTO: NORMAL /HPF
BILIRUB UR QL STRIP: NEGATIVE
CLARITY UR: CLEAR
COLOR UR: NORMAL
GLUCOSE UR STRIP-MCNC: NEGATIVE MG/DL
HGB UR QL STRIP.AUTO: NEGATIVE
KETONES UR STRIP-MCNC: NEGATIVE MG/DL
LEUKOCYTE ESTERASE UR QL STRIP: NEGATIVE
NITRITE UR QL STRIP: NEGATIVE
NON-SQ EPI CELLS URNS QL MICRO: NORMAL /HPF
PH UR STRIP.AUTO: 6.5 [PH]
PROT UR STRIP-MCNC: NEGATIVE MG/DL
RBC #/AREA URNS AUTO: NORMAL /HPF
SP GR UR STRIP.AUTO: 1.01 (ref 1–1.03)
UROBILINOGEN UR STRIP-ACNC: <2 MG/DL
WBC #/AREA URNS AUTO: NORMAL /HPF

## 2024-06-12 PROCEDURE — 87086 URINE CULTURE/COLONY COUNT: CPT | Performed by: INTERNAL MEDICINE

## 2024-06-12 PROCEDURE — 81001 URINALYSIS AUTO W/SCOPE: CPT | Performed by: INTERNAL MEDICINE

## 2024-06-13 ENCOUNTER — HOSPITAL ENCOUNTER (OUTPATIENT)
Dept: RADIOLOGY | Facility: HOSPITAL | Age: 79
End: 2024-06-13
Payer: MEDICARE

## 2024-06-13 DIAGNOSIS — M54.50 LOW BACK PAIN, UNSPECIFIED BACK PAIN LATERALITY, UNSPECIFIED CHRONICITY, UNSPECIFIED WHETHER SCIATICA PRESENT: ICD-10-CM

## 2024-06-13 LAB — BACTERIA UR CULT: NORMAL

## 2024-06-13 PROCEDURE — 72100 X-RAY EXAM L-S SPINE 2/3 VWS: CPT

## 2024-07-15 ENCOUNTER — HOSPITAL ENCOUNTER (OUTPATIENT)
Dept: RADIOLOGY | Facility: HOSPITAL | Age: 79
Discharge: HOME | End: 2024-07-15

## 2024-07-15 DIAGNOSIS — F03.90 DEMENTIA, UNSPECIFIED DEMENTIA SEVERITY, UNSPECIFIED DEMENTIA TYPE, UNSPECIFIED WHETHER BEHAVIORAL, PSYCHOTIC, OR MOOD DISTURBANCE OR ANXIETY (HCC): ICD-10-CM

## 2024-08-26 ENCOUNTER — OFFICE VISIT (OUTPATIENT)
Dept: HEMATOLOGY ONCOLOGY | Facility: CLINIC | Age: 79
End: 2024-08-26
Payer: COMMERCIAL

## 2024-08-26 VITALS
OXYGEN SATURATION: 98 % | SYSTOLIC BLOOD PRESSURE: 130 MMHG | TEMPERATURE: 98 F | DIASTOLIC BLOOD PRESSURE: 78 MMHG | RESPIRATION RATE: 17 BRPM | WEIGHT: 185.4 LBS | HEIGHT: 72 IN | HEART RATE: 57 BPM | BODY MASS INDEX: 25.11 KG/M2

## 2024-08-26 DIAGNOSIS — D50.9 IRON DEFICIENCY ANEMIA, UNSPECIFIED IRON DEFICIENCY ANEMIA TYPE: ICD-10-CM

## 2024-08-26 DIAGNOSIS — D64.9 ANEMIA, UNSPECIFIED TYPE: ICD-10-CM

## 2024-08-26 DIAGNOSIS — E53.8 FOLATE DEFICIENCY: ICD-10-CM

## 2024-08-26 DIAGNOSIS — D69.6 THROMBOCYTOPENIA (HCC): Primary | ICD-10-CM

## 2024-08-26 DIAGNOSIS — D51.8 OTHER VITAMIN B12 DEFICIENCY ANEMIA: ICD-10-CM

## 2024-08-26 DIAGNOSIS — N18.30 STAGE 3 CHRONIC KIDNEY DISEASE, UNSPECIFIED WHETHER STAGE 3A OR 3B CKD (HCC): ICD-10-CM

## 2024-08-26 PROCEDURE — 99204 OFFICE O/P NEW MOD 45 MIN: CPT | Performed by: NURSE PRACTITIONER

## 2024-08-26 NOTE — PROGRESS NOTES
[facility] HEMATOLOGY ONCOLOGY SPECIALISTS [place]  [address] [address]  [phone]  Hematology Ambulatory Consult  Usman Almanzar, 1945, [id number]  2024      Assessment and Plan   1. Stage 3 chronic kidney disease, unspecified whether stage 3a or 3b CKD (HCC)  2. Thrombocytopenia (HCC)  3. Iron deficiency anemia, unspecified iron deficiency anemia type  4. Other vitamin B12 deficiency anemia  5. Folate deficiency  6. Anemia, unspecified type  Patient is a 78-year-old male with a history of anxiety, anemia, CKD, cognitive communication disorder, hyperlipidemia, prediabetes who currently lives in [facility] who was referred for further evaluation of anemia.  Patient is present during visit however not really able to participate in his history.  Medical history is provided by his sister-in-law Christine who is his power of .  Labs from 2024 show a WBC 8.3, RBC 3.53, hemoglobin 11.3, MCV 98.3, platelets 135, creatinine 1.5, EGFR 69.  Normal calcium and protein levels.  Per chart review patient has been anemic in the past ranging between 10.6-11.9, platelets have also been low 1  since at least 2023..  Per chart review patient has a history of dementia.  His sister in law states he has not followed with a physician for many years until last year.  He had lived with his brother who  last year.  Shortly after that patient was hospitalized and sent to [facility].  We discussed obtaining updated labs including nutritional studies to evaluate thrombocytopenia.  We will also get a myeloma panel since he has an elevated creatinine and anemia.  Will get labs now and again in 3 months and see him shortly after.  I suspect observation will be appropriate unless he is found to be deficient in iron folate or B12.  Patient and his sister-in-law Christine verbalized understanding.  If we need to contact Christine her phone number is  [phone].      - CBC and differential; Standing  - Iron Panel (Includes Ferritin, Iron Sat%, Iron, and TIBC); Future  - Folate; Future  - Comprehensive metabolic panel; Standing  - Vitamin B12; Future  - Protein electrophoresis, serum; Future  - Immunoglobulin free LT chains blood; Future  - Peripheral Smear; Standing    Barrier(s) to care: None.   The patient is able to self care.    Ruthie ADEN  Medical Oncology/Hematology  [facility]    Subjective     Chief Complaint   Patient presents with    Consult       Referring provider    Referral Self  No address on file    History of present illness:   Patient is a 78-year-old male with a history of anxiety, anemia, CKD, cognitive communication disorder, hyperlipidemia, prediabetes who currently lives in [facility] who was referred for further evaluation of anemia.    08/26/24: Clinically stable    Review of Systems    Past Medical History:   Diagnosis Date    Anemia     Anxiety     Bilateral inguinal hernia 04/23/2022    Evaluated @ [facility]    Bronchitis     Chronic kidney disease     ckd 3    Cognitive communication disorder     Colitis     Hyperlipidemia     Pre-diabetes     Trauma     mechanical fall down stairs     Past Surgical History:   Procedure Laterality Date    CATARACT EXTRACTION      COLONOSCOPY      COLONOSCOPY W/ BIOPSIES  11/12/2001    Tabitha Thomas MD    MOHS RECONSTRUCTION      basal ca    SD RPR 1ST INGUN HRNA AGE 5 YRS/> REDUCIBLE Bilateral 8/10/2022    Procedure: REPAIR HERNIA INGUINAL;  Surgeon: Rajat Silva MD;  Location: [facility];  Service: General     No family history on file.  Social History     Socioeconomic History    Marital status: Single     Spouse name: Not on file    Number of children: Not on file    Years of education: Not on file    Highest education level: Not on file   Occupational History    Not on file   Tobacco Use    Smoking status: Former     Current packs/day: 0.00      Types: Cigarettes     Start date:      Quit date:      Years since quittin.6    Smokeless tobacco: Never   Vaping Use    Vaping status: Never Used   Substance and Sexual Activity    Alcohol use: Yes     Comment: rare    Drug use: Not Currently    Sexual activity: Not on file   Other Topics Concern    Not on file   Social History Narrative    Not on file     Social Determinants of Health     Financial Resource Strain: Not on file   Food Insecurity: No Food Insecurity (2023)    Hunger Vital Sign     Worried About Running Out of Food in the Last Year: Never true     Ran Out of Food in the Last Year: Never true   Transportation Needs: No Transportation Needs (2023)    PRAPARE - Transportation     Lack of Transportation (Medical): No     Lack of Transportation (Non-Medical): No   Physical Activity: Not on file   Stress: Not on file   Social Connections: Not on file   Intimate Partner Violence: Not on file   Housing Stability: Low Risk  (2023)    Housing Stability Vital Sign     Unable to Pay for Housing in the Last Year: No     Number of Places Lived in the Last Year: 1     Unstable Housing in the Last Year: No         Current Outpatient Medications:     acetaminophen (TYLENOL) 325 mg tablet, Take 3 tablets (975 mg total) by mouth every 8 (eight) hours as needed for mild pain, Disp: , Rfl: 0    atorvastatin (LIPITOR) 10 mg tablet, Take 10 mg by mouth daily, Disp: , Rfl:     meclizine (ANTIVERT) 25 mg tablet, Take 1 tablet (25 mg total) by mouth every 8 (eight) hours as needed for dizziness for up to 30 doses, Disp: 30 tablet, Rfl: 0    sertraline (ZOLOFT) 25 mg tablet, Take 25 mg by mouth 2 (two) times a day before lunch and dinner, Disp: , Rfl:   No Known Allergies    Objective   /78 (BP Location: Left arm, Patient Position: Sitting, Cuff Size: Adult)   Pulse 57   Temp 98 °F (36.7 °C)   Resp 17   Ht 6' (1.829 m)   Wt 84.1 kg (185 lb 6.4 oz)   SpO2 98%   BMI 25.14 kg/m²    Physical Exam    Result Review  Labs:  Lab Results   Component Value Date    WBC 5.64 07/08/2023    HGB 11.5 (L) 07/08/2023    HCT 35.7 (L) 07/08/2023     (H) 07/08/2023     (L) 07/08/2023     Lab Results   Component Value Date    SODIUM 140 07/08/2023    K 4.3 07/08/2023     (H) 07/08/2023    CO2 25 07/08/2023    AGAP 2 07/08/2023    BUN 21 07/08/2023    CREATININE 1.42 (H) 07/08/2023    GLUC 91 07/08/2023    GLUF 113 (H) 08/22/2022    CALCIUM 8.5 07/08/2023    AST 23 07/08/2023    ALT 29 07/08/2023    ALKPHOS 52 07/08/2023    TP 5.8 (L) 07/08/2023    TBILI 0.45 07/08/2023    EGFR 47 07/08/2023     Imaging:   CT head w wo contrast    Result Date: 8/14/2024  Narrative: CT head without and with contrast INDICATION: Dementia. COMPARISON: None. TECHNIQUE: CT imaging of the head was performed before and after administration of 75 mL Omnipaque 350 intravenously with multiplanar reformats. This CT examination was performed using one or more of the following dose reduction techniques: Automated exposure control, adjustment of the mA and/or kV according to patient's size and the use of iterative reconstruction technique. DICOM format image data are available to non-affiliated external healthcare facilities or entities on a secure, media-free, reciprocally searchable basis with patient authorization for at least a 12-month period after the study. FINDINGS: Brain Parenchyma: No acute hemorrhage. No mass effect or midline shift. No evidence of acute large territorial infarct. Gray-white differentiation is maintained. There is age-related mild diffuse cerebral volume loss. There is mild hypoattenuation of the periventricular white matter, a nonspecific finding most compatible with chronic microvascular ischemic change. There is no abnormal enhancement. Ventricles and Extra-Axial Spaces: Enlargement of the ventricles and sulci in keeping with age-related diffuse cerebral volume loss. No extra-axial hematoma  or fluid collection. Extracranial Structures: The calvarium is normal. The skull base and sella are unremarkable. Soft tissues are normal. The visualized paranasal sinuses are clear. Mastoid air cells and middle ear cavities are clear. Visualized orbits are unremarkable.    Impression: IMPRESSION: 1.  No acute intracranial abnormality. 2.  No abnormal enhancement. 3.  Mild atrophy and chronic microvascular ischemic changes. Workstation:[id number]      Please note:  This report has been generated by a voice recognition software system. Therefore there may be syntax, spelling, and/or grammatical errors. Please call if you have any questions.

## 2024-09-04 ENCOUNTER — TELEPHONE (OUTPATIENT)
Dept: HEMATOLOGY ONCOLOGY | Facility: CLINIC | Age: 79
End: 2024-09-04

## 2024-09-04 LAB
ACANTHOCYTES BLD QL SMEAR: SLIGHT
ALBUMIN SERPL-MCNC: 4.3 G/DL (ref 3.5–5.7)
ALP SERPL-CCNC: 72 U/L (ref 35–120)
ALT SERPL-CCNC: 15 U/L
ANION GAP SERPL CALCULATED.3IONS-SCNC: 9 MMOL/L (ref 3–11)
AST SERPL-CCNC: 23 U/L
BASOPHILS # BLD AUTO: 0.1 THOU/CMM (ref 0–0.1)
BASOPHILS NFR BLD AUTO: 1 %
BILIRUB SERPL-MCNC: 0.6 MG/DL (ref 0.2–1)
BUN SERPL-MCNC: 28 MG/DL (ref 7–28)
CALCIUM SERPL-MCNC: 9.4 MG/DL (ref 8.5–10.1)
CHLORIDE SERPL-SCNC: 107 MMOL/L (ref 100–109)
CO2 SERPL-SCNC: 26 MMOL/L (ref 21–31)
CREAT SERPL-MCNC: 1.55 MG/DL (ref 0.53–1.3)
CYTOLOGY CMNT CVX/VAG CYTO-IMP: ABNORMAL
DIFFERENTIAL METHOD BLD: ABNORMAL
EOSINOPHIL # BLD AUTO: 0.3 THOU/CMM (ref 0–0.5)
EOSINOPHIL NFR BLD AUTO: 4 %
ERYTHROCYTE [DISTWIDTH] IN BLOOD BY AUTOMATED COUNT: 12.9 % (ref 12–16)
FERRITIN SERPL-MCNC: 687 NG/ML (ref 23.9–336.2)
FOLATE SERPL-MCNC: 7.6 NG/ML
GFR/BSA.PRED SERPLBLD CYS-BASED-ARV: 46 ML/MIN/{1.73_M2}
GLUCOSE SERPL-MCNC: 93 MG/DL (ref 65–99)
HCT VFR BLD AUTO: 33.4 % (ref 37–48)
HGB BLD-MCNC: 11.3 G/DL (ref 12.5–17)
IRON SATN MFR SERPL: 42 % (ref 20–50)
IRON SERPL-MCNC: 98 UG/DL (ref 50–212)
KAPPA LC FREE SER-MCNC: 40.8 MG/L (ref 3.3–19.4)
KAPPA LC FREE/LAMBDA FREE SER: 2.07 {RATIO} (ref 0.26–1.65)
LAMBDA LC FREE SERPL-MCNC: 19.75 MG/L (ref 5.7–26.3)
LYMPHOCYTES # BLD AUTO: 1.5 THOU/CMM (ref 1–3)
LYMPHOCYTES NFR BLD AUTO: 22 %
MCH RBC QN AUTO: 32.5 PG (ref 27–36)
MCHC RBC AUTO-ENTMCNC: 33.7 G/DL (ref 32–37)
MCV RBC AUTO: 97 FL (ref 80–100)
MONOCYTES # BLD AUTO: 0.5 THOU/CMM (ref 0.3–1)
MONOCYTES NFR BLD AUTO: 7 %
MORPHOLOGY BLD-IMP: ABNORMAL
NEUTROPHILS # BLD AUTO: 4.5 THOU/CMM (ref 1.8–7.8)
NEUTROPHILS NFR BLD AUTO: 62 %
NEUTS BAND NFR BLD MANUAL: 4 % (ref 0–6)
OVALOCYTES BLD QL SMEAR: SLIGHT
PLATELET # BLD AUTO: 119 THOU/CMM (ref 140–350)
PMV BLD REES-ECKER: 9.4 FL (ref 7.5–11.3)
POLYCHROMASIA BLD QL SMEAR: ABNORMAL
POTASSIUM SERPL-SCNC: 4.9 MMOL/L (ref 3.5–5.2)
PROT SERPL-MCNC: 6.4 G/DL (ref 6.3–8.3)
RBC # BLD AUTO: 3.46 MILL/CMM (ref 4–5.4)
SODIUM SERPL-SCNC: 142 MMOL/L (ref 135–145)
TIBC SERPL-MCNC: 232 UG/DL (ref 260–430)
TRANSFERRIN SERPL-MCNC: 166 MG/DL (ref 203–362)
VIT B12 SERPL-MCNC: 184 PG/ML (ref 180–914)
WBC # BLD AUTO: 6.7 THOU/CMM (ref 4–10.5)

## 2024-09-04 NOTE — TELEPHONE ENCOUNTER
Called and left message for patient's sister-in-law Christine to review the lab work.  Instructed to call back through the HOPE line.  Patient is not active in [employer].

## 2024-09-05 ENCOUNTER — TELEPHONE (OUTPATIENT)
Age: 79
End: 2024-09-05

## 2024-09-05 LAB
ALBUMIN SERPL ELPH-MCNC: 3.9 G/DL (ref 4.1–5.1)
ALBUMIN/GLOB SERPL: 1.6 {RATIO}
ALPHA1 GLOB SERPL ELPH-MCNC: 0.3 G/DL (ref 0.1–0.2)
ALPHA2 GLOB SERPL ELPH-MCNC: 0.8 G/DL (ref 0.6–0.9)
B-GLOBULIN SERPL ELPH-MCNC: 0.6 G/DL (ref 0.6–1)
GAMMA GLOB SERPL ELPH-MCNC: 0.9 G/DL (ref 0.5–1.2)
PROT PATTERN SERPL ELPH-IMP: ABNORMAL
PROT SERPL-MCNC: 6.4 G/DL (ref 6.3–8.3)
SL AMB PROTEIN ELECTROPHORESIS, S: ABNORMAL

## 2024-09-05 NOTE — TELEPHONE ENCOUNTER
Patients sister calling to inform Ruthie Ky she will available to speak with her today until 3:30pm.  She can be reached at [phone]. She states it's to discuss his results.

## 2024-09-05 NOTE — TELEPHONE ENCOUNTER
Called and spoke with patient's sister-in-law Christine Almanzar to review the labs.  CBC shows a hemoglobin of 11.3, hematocrit 33.4, RBC 3.46, platelet count 119.  Not significantly changed from July 2023.  Creatinine 1.55, calcium 9.4, protein 6.4, EGFR 46.  Free light chains showed an elevated kappa band of 40.8, Kappa lambda ratio 2.07.  SPEP was ordered however I do not have results at this time.  We will follow-up.  Iron saturation 42% with a ferritin level of 687.  Etiology of elevated ferritin can be inflammatory/reactive.  He has not had any previous ferritin levels therefore we will trend although a hemochromatosis panel may be considered in the future if continue to be elevated.  Vitamin B12 level is very low at 184, folic acid also low normal at 7.6.  We discussed initiating folic acid and vitamin B12.  I will be in touch with the nursing home physician to get the supplements ordered.  Christine verbalized understanding and agrees to the plan.  We will see patient in December 2024.

## 2024-09-20 ENCOUNTER — TELEPHONE (OUTPATIENT)
Dept: HEMATOLOGY ONCOLOGY | Facility: CLINIC | Age: 79
End: 2024-09-20

## 2024-09-20 NOTE — TELEPHONE ENCOUNTER
Lance Reaves sent a fax over regarding patient's labs.  It looks like they stanislaw up the cbc and cmp but not the peripheral smear and wanted to know if we still need for him to have it done.    I called and spoke to Jessica.  Yes it will need to be done.  It's a standing order every 12 weeks along with the cbc and cmp.  No need to fax another slip.

## 2024-09-24 NOTE — TELEPHONE ENCOUNTER
----- Message from Jose Reinoso PA-C sent at 8/12/2022  3:30 PM EDT -----  Patient d/c from [facility] today   Please call to schedule hospital follow up and repeat BMP in 1 week 156

## 2024-09-26 LAB
ACANTHOCYTES BLD QL SMEAR: ABNORMAL
BASOPHILS # BLD AUTO: 0 THOU/CMM (ref 0–0.1)
BASOPHILS NFR BLD AUTO: 0 %
DIFFERENTIAL METHOD BLD: ABNORMAL
EOSINOPHIL # BLD AUTO: 0.1 THOU/CMM (ref 0–0.5)
EOSINOPHIL NFR BLD AUTO: 1 %
ERYTHROCYTE [DISTWIDTH] IN BLOOD BY AUTOMATED COUNT: 12.5 % (ref 12–16)
HCT VFR BLD AUTO: 33.2 % (ref 37–48)
HGB BLD-MCNC: 11.3 G/DL (ref 12.5–17)
LYMPHOCYTES # BLD AUTO: 1.1 THOU/CMM (ref 1–3)
LYMPHOCYTES NFR BLD AUTO: 17 %
MCH RBC QN AUTO: 32.7 PG (ref 27–36)
MCHC RBC AUTO-ENTMCNC: 34.1 G/DL (ref 32–37)
MCV RBC AUTO: 96 FL (ref 80–100)
MONOCYTES # BLD AUTO: 0.3 THOU/CMM (ref 0.3–1)
MONOCYTES NFR BLD AUTO: 4 %
MORPHOLOGY BLD-IMP: ABNORMAL
NEUTROPHILS # BLD AUTO: 5.2 THOU/CMM (ref 1.8–7.8)
NEUTROPHILS NFR BLD AUTO: 78 %
OVALOCYTES BLD QL SMEAR: SLIGHT
PLATELET # BLD AUTO: 123 THOU/CMM (ref 140–350)
PMV BLD REES-ECKER: 9.1 FL (ref 7.5–11.3)
RBC # BLD AUTO: 3.46 MILL/CMM (ref 4–5.4)
WBC # BLD AUTO: 6.7 THOU/CMM (ref 4–10.5)

## 2024-09-30 ENCOUNTER — APPOINTMENT (EMERGENCY)
Dept: RADIOLOGY | Facility: HOSPITAL | Age: 79
End: 2024-09-30
Payer: COMMERCIAL

## 2024-09-30 ENCOUNTER — HOSPITAL ENCOUNTER (EMERGENCY)
Facility: HOSPITAL | Age: 79
Discharge: NURSING, REHAB OR LONG-TERM CARE | End: 2024-09-30
Attending: EMERGENCY MEDICINE
Payer: COMMERCIAL

## 2024-09-30 VITALS
HEART RATE: 60 BPM | SYSTOLIC BLOOD PRESSURE: 158 MMHG | RESPIRATION RATE: 17 BRPM | TEMPERATURE: 98.9 F | OXYGEN SATURATION: 97 % | DIASTOLIC BLOOD PRESSURE: 85 MMHG
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
## 2024-09-30 LAB
ALBUMIN SERPL BCG-MCNC: 4.1 G/DL (ref 3.5–5)
ALP SERPL-CCNC: 65 U/L (ref 34–104)
ALT SERPL W P-5'-P-CCNC: 15 U/L (ref 7–52)
ANION GAP SERPL CALCULATED.3IONS-SCNC: 6 MMOL/L (ref 4–13)
AST SERPL W P-5'-P-CCNC: 19 U/L (ref 13–39)
ATRIAL RATE: 163 BPM
BASOPHILS # BLD AUTO: 0.03 THOUSANDS/ÂΜL (ref 0–0.1)
BASOPHILS NFR BLD AUTO: 0 % (ref 0–1)
BILIRUB SERPL-MCNC: 0.34 MG/DL (ref 0.2–1)
BUN SERPL-MCNC: 27 MG/DL (ref 5–25)
CALCIUM SERPL-MCNC: 8.6 MG/DL (ref 8.4–10.2)
CHLORIDE SERPL-SCNC: 107 MMOL/L (ref 96–108)
CK SERPL-CCNC: 191 U/L (ref 39–308)
CO2 SERPL-SCNC: 26 MMOL/L (ref 21–32)
CREAT SERPL-MCNC: 1.63 MG/DL (ref 0.6–1.3)
EOSINOPHIL # BLD AUTO: 0.08 THOUSAND/ÂΜL (ref 0–0.61)
EOSINOPHIL NFR BLD AUTO: 1 % (ref 0–6)
ERYTHROCYTE [DISTWIDTH] IN BLOOD BY AUTOMATED COUNT: 12 % (ref 11.6–15.1)
GFR SERPL CREATININE-BSD FRML MDRD: 40 ML/MIN/1.73SQ M
GLUCOSE SERPL-MCNC: 93 MG/DL (ref 65–140)
HCT VFR BLD AUTO: 31.6 % (ref 36.5–49.3)
HGB BLD-MCNC: 10.5 G/DL (ref 12–17)
IMM GRANULOCYTES # BLD AUTO: 0.02 THOUSAND/UL (ref 0–0.2)
IMM GRANULOCYTES NFR BLD AUTO: 0 % (ref 0–2)
LYMPHOCYTES # BLD AUTO: 1.31 THOUSANDS/ÂΜL (ref 0.6–4.47)
LYMPHOCYTES NFR BLD AUTO: 19 % (ref 14–44)
MCH RBC QN AUTO: 32.2 PG (ref 26.8–34.3)
MCHC RBC AUTO-ENTMCNC: 33.2 G/DL (ref 31.4–37.4)
MCV RBC AUTO: 97 FL (ref 82–98)
MONOCYTES # BLD AUTO: 0.67 THOUSAND/ÂΜL (ref 0.17–1.22)
MONOCYTES NFR BLD AUTO: 10 % (ref 4–12)
NEUTROPHILS # BLD AUTO: 4.66 THOUSANDS/ÂΜL (ref 1.85–7.62)
NEUTS SEG NFR BLD AUTO: 70 % (ref 43–75)
NRBC BLD AUTO-RTO: 0 /100 WBCS
PLATELET # BLD AUTO: 131 THOUSANDS/UL (ref 149–390)
PMV BLD AUTO: 10.8 FL (ref 8.9–12.7)
POTASSIUM SERPL-SCNC: 4.5 MMOL/L (ref 3.5–5.3)
PROT SERPL-MCNC: 6.1 G/DL (ref 6.4–8.4)
QRS AXIS: 32 DEGREES
QRSD INTERVAL: 128 MS
QT INTERVAL: 452 MS
QTC INTERVAL: 424 MS
RBC # BLD AUTO: 3.26 MILLION/UL (ref 3.88–5.62)
SODIUM SERPL-SCNC: 139 MMOL/L (ref 135–147)
T WAVE AXIS: 50 DEGREES
VENTRICULAR RATE: 53 BPM
WBC # BLD AUTO: 6.77 THOUSAND/UL (ref 4.31–10.16)

## 2024-09-30 PROCEDURE — 99284 EMERGENCY DEPT VISIT MOD MDM: CPT

## 2024-09-30 PROCEDURE — 93005 ELECTROCARDIOGRAM TRACING: CPT

## 2024-09-30 PROCEDURE — 82550 ASSAY OF CK (CPK): CPT

## 2024-09-30 PROCEDURE — 36415 COLL VENOUS BLD VENIPUNCTURE: CPT

## 2024-09-30 PROCEDURE — 99285 EMERGENCY DEPT VISIT HI MDM: CPT | Performed by: EMERGENCY MEDICINE

## 2024-09-30 PROCEDURE — 72125 CT NECK SPINE W/O DYE: CPT

## 2024-09-30 PROCEDURE — 93010 ELECTROCARDIOGRAM REPORT: CPT | Performed by: INTERNAL MEDICINE

## 2024-09-30 PROCEDURE — 80053 COMPREHEN METABOLIC PANEL: CPT

## 2024-09-30 PROCEDURE — 85025 COMPLETE CBC W/AUTO DIFF WBC: CPT

## 2024-09-30 PROCEDURE — 70450 CT HEAD/BRAIN W/O DYE: CPT

## 2024-09-30 NOTE — DISCHARGE INSTRUCTIONS
You have been seen in the emergency department for evaluation of a fall. Your labs and CT scans were normal today. Return to the ED for new symptoms or repeat falls.

## 2024-09-30 NOTE — ED ATTENDING ATTESTATION
9/30/2024  I, Luca Boyd DO, saw and evaluated the patient. I have discussed the patient with the resident/non-physician practitioner and agree with the resident's/non-physician practitioner's findings, Plan of Care, and MDM as documented in the resident's/non-physician practitioner's note, except where noted. All available labs and Radiology studies were reviewed.  I was present for key portions of any procedure(s) performed by the resident/non-physician practitioner and I was immediately available to provide assistance.       At this point I agree with the current assessment done in the Emergency Department.  I have conducted an independent evaluation of this patient a history and physical is as follows:    Patient is a 79-year-old male with a history of anxiety, CKD, chronic dementia, brought in from a [facility].  Per EMS, the patient had a fall at the facility with a positive head strike, he is not on anticoagulation, which are verified by reviewing his transfer medication list.  EMS indicates patient remained stable during transport.  Per EMS, the patient is at his baseline mental status.  The patient tells me that he fell while at a medical office building, hit his head, says right now he has no complaints other than feeling chronically dizzy which he says he always does.  No blurred vision, no double vision, no numbness or tingling.  No chest pain or palpitations    General:  Patient is well-appearing  Head: 4 cm superficial abrasion to the top of his head, not actively bleeding, no bogginess or hematoma  Eyes:  Conjunctiva pink, Extraocular muscle intact, no periorbital ecchymosis, PERRL  ENT:  Mucous membranes are moist, no dental malocclusion, no craniofacial instability, no Ballard signs  Neck:  No midline tenderness or step-offs or deformities  Cardiac:  S1-S2, without murmurs, no chest wall tenderness  Lungs:  Clear to auscultation bilaterally  Abdomen:  Soft, nontender, normal  bowel sounds, no CVA tenderness, no tympany, no rigidity, no guarding  Extremities:  No bony tenderness to the bilateral bilateral humeral heads, humerus, elbows, radius, ulna, hands, hips, femurs, knees, tibia, fibula, feet. No pain with passive range of motion at the bilateral shoulders, elbows, wrists, hips, knees, or ankles.  Back:No midline thoracic, lumbar, sacral tenderness, deformities, or step-offs.  Neurologic:  Awake, fluent speech, no facial droop, oriented to person and place, confused as far as time and recent events.  Strength is 5 out of 5 in the bilateral arms and legs.  Normal sensation throughout the whole body.  Skin:  Pink warm and dry  Psychiatric:  Alert, pleasant, cooperative      ED Course  ED Course as of 09/30/24 1803   Mon Sep 30, 2024   1758 ECG performed at 1722 hrs., interpreted by me, sinus rhythm, rate of 53, normal axis, some mild baseline artifact, no acute ischemic or infarctive changes, nonspecific intraventricular conduction delay which is new when compared with July 5, 2023     Labs Reviewed   CBC AND DIFFERENTIAL - Abnormal       Result Value Ref Range Status    WBC 6.77  4.31 - 10.16 Thousand/uL Final    RBC 3.26 (*) 3.88 - 5.62 Million/uL Final    Hemoglobin 10.5 (*) 12.0 - 17.0 g/dL Final    Hematocrit 31.6 (*) 36.5 - 49.3 % Final    MCV 97  82 - 98 fL Final    MCH 32.2  26.8 - 34.3 pg Final    MCHC 33.2  31.4 - 37.4 g/dL Final    RDW 12.0  11.6 - 15.1 % Final    MPV 10.8  8.9 - 12.7 fL Final    Platelets 131 (*) 149 - 390 Thousands/uL Final    nRBC 0  /100 WBCs Final    Segmented % 70  43 - 75 % Final    Immature Grans % 0  0 - 2 % Final    Lymphocytes % 19  14 - 44 % Final    Monocytes % 10  4 - 12 % Final    Eosinophils Relative 1  0 - 6 % Final    Basophils Relative 0  0 - 1 % Final    Absolute Neutrophils 4.66  1.85 - 7.62 Thousands/µL Final    Absolute Immature Grans 0.02  0.00 - 0.20 Thousand/uL Final    Absolute Lymphocytes 1.31  0.60 - 4.47 Thousands/µL Final     Absolute Monocytes 0.67  0.17 - 1.22 Thousand/µL Final    Eosinophils Absolute 0.08  0.00 - 0.61 Thousand/µL Final    Basophils Absolute 0.03  0.00 - 0.10 Thousands/µL Final   COMPREHENSIVE METABOLIC PANEL - Abnormal    Sodium 139  135 - 147 mmol/L Final    Potassium 4.5  3.5 - 5.3 mmol/L Final    Chloride 107  96 - 108 mmol/L Final    CO2 26  21 - 32 mmol/L Final    ANION GAP 6  4 - 13 mmol/L Final    BUN 27 (*) 5 - 25 mg/dL Final    Creatinine 1.63 (*) 0.60 - 1.30 mg/dL Final    Comment: Standardized to IDMS reference method    Glucose 93  65 - 140 mg/dL Final    Comment: If the patient is fasting, the ADA then defines impaired fasting glucose as > 100 mg/dL and diabetes as > or equal to 123 mg/dL.    Calcium 8.6  8.4 - 10.2 mg/dL Final    AST 19  13 - 39 U/L Final    ALT 15  7 - 52 U/L Final    Comment: Specimen collection should occur prior to Sulfasalazine administration due to the potential for falsely depressed results.     Alkaline Phosphatase 65  34 - 104 U/L Final    Total Protein 6.1 (*) 6.4 - 8.4 g/dL Final    Albumin 4.1  3.5 - 5.0 g/dL Final    Total Bilirubin 0.34  0.20 - 1.00 mg/dL Final    Comment: Use of this assay is not recommended for patients undergoing treatment with eltrombopag due to the potential for falsely elevated results.  N-acetyl-p-benzoquinone imine (metabolite of Acetaminophen) will generate erroneously low results in samples for patients that have taken an overdose of Acetaminophen.    eGFR 40  ml/min/1.73sq m Final    Narrative:     National Kidney Disease Foundation guidelines for Chronic Kidney Disease (CKD):     Stage 1 with normal or high GFR (GFR > 90 mL/min/1.73 square meters)    Stage 2 Mild CKD (GFR = 60-89 mL/min/1.73 square meters)    Stage 3A Moderate CKD (GFR = 45-59 mL/min/1.73 square meters)    Stage 3B Moderate CKD (GFR = 30-44 mL/min/1.73 square meters)    Stage 4 Severe CKD (GFR = 15-29 mL/min/1.73 square meters)    Stage 5 End Stage CKD (GFR <15 mL/min/1.73  square meters)  Note: GFR calculation is accurate only with a steady state creatinine   CK - Normal    Total   39 - 308 U/L Final     CT head without contrast   Final Result      No acute intracranial abnormality.                  Workstation performed: [id number]         CT spine cervical without contrast   Final Result      No cervical spine fracture or traumatic malalignment.                  Workstation performed: [id number]           Phone call was placed the patient's facility, there was no answer, voicemail was left, at the time of patient disposition, no return phone call had been received.    Patient overall well-appearing, small abrasion, nothing to suture or staple.  His head CT and cervical spine CT interpreted by me show no acute fracture, no acute hemorrhage or acute pathology.  ECG shows no evidence of likely dysrhythmia, laboratory studies show no evidence of rhabdomyolysis, no evidence of severe anemia or significant metabolic abnormality.  Does have CKD.  Believe it is reasonable with the patient be discharged back to his facility.          DIAGNOSIS:  Acute fall, acute closed head injury, chronic dementia, CKD    MEDICAL DECISION MAKING CODING    COLLECTION AND INTERPRETATION OF DATA  I reviewed prior external notes, including July 5, 2023 ECG    I ordered each unique test  Tests reviewed personally by me:  ECG: See my ED course  Labs: See above  Imaging: I independently interpreted the CT as noted above.            Critical Care Time  Procedures

## 2024-10-01 NOTE — ED PROVIDER NOTES
Final diagnoses:   Fall, initial encounter     ED Disposition       ED Disposition   Discharge    Condition   Stable    Date/Time   Mon Sep 30, 2024  7:14 PM    Comment   Usman Hayss discharge to home/self care.                   Assessment & Plan       Medical Decision Making  Amount and/or Complexity of Data Reviewed  Labs: ordered. Decision-making details documented in ED Course.  Radiology: ordered. Decision-making details documented in ED Course.        ED Course as of 10/01/24 1159   Mon Sep 30, 2024   1750 Patient seen and evaluated by me  Ddx: Intracranial bleeding/injury, c-spine injury. Cause of fall could be mechanical, cardiac, anemic, dehydration or electrolyte related.  Unclear downtime given inability to obtain further history from [facility]-prolonged downtime could increase suspicion for rhabdomyolysis.  Workup and plan: CBC, CMP, CK, EKG, CT head, CT C-spine.     1751 EKG done at 1722 interpreted by me   rate 53  rhythm normal sinus, RBBB. Baseline artifact   axis normal  QRS complexes are wide  Intervals are normal  ST segments showing no ischemic changes   No obvious delta wave, dagger Q waves, QT prolongation, Brugada pattern, epsilon wave, or A-V dissociation   1906 Hemoglobin(!): 10.5  Baseline   1906 Platelet Count(!): 131  Baseline   1906 CT head without contrast  IMPRESSION:     No acute intracranial abnormality.     1906 CT spine cervical without contrast  IMPRESSION:     No cervical spine fracture or traumatic malalignment.        1912 Total CK: 191   1913 Comprehensive metabolic panel(!)  Kidney function baseline, crt 1.55 3 weeks prior.   1916 Patient discharged at this time, given return precautions and follow up information. Patient and/or parent report understanding, all questions answered.          Medications - No data to display    ED Risk Strat Scores                                               History of Present Illness       Chief Complaint   Patient presents with     Fall     Patient fall at facility where he resides, +HS. Denies thinners and ASA. EMS reports patient is GCS 14 at baseline and dizzy.        Past Medical History:   Diagnosis Date    Anemia     Anxiety     Bilateral inguinal hernia 2022    Evaluated @ [facility]    Bronchitis     Chronic kidney disease     ckd 3    Cognitive communication disorder     Colitis     Hyperlipidemia     Pre-diabetes     Trauma     mechanical fall down stairs      Past Surgical History:   Procedure Laterality Date    CATARACT EXTRACTION      COLONOSCOPY      COLONOSCOPY W/ BIOPSIES  2001    Tabitha Thomas MD    MOHS RECONSTRUCTION      basal ca    AK RPR 1ST INGUN HRNA AGE 5 YRS/> REDUCIBLE Bilateral 8/10/2022    Procedure: REPAIR HERNIA INGUINAL;  Surgeon: Rajat Silva MD;  Location: [facility];  Service: General      History reviewed. No pertinent family history.   Social History     Tobacco Use    Smoking status: Former     Current packs/day: 0.00     Types: Cigarettes     Start date:      Quit date:      Years since quittin.7    Smokeless tobacco: Never   Vaping Use    Vaping status: Never Used   Substance Use Topics    Alcohol use: Yes     Comment: rare    Drug use: Not Currently      E-Cigarette/Vaping    E-Cigarette Use Never User       E-Cigarette/Vaping Substances    Nicotine No     THC No     CBD No     Flavoring No     Other No     Unknown No       I have reviewed and agree with the history as documented.     Patient is a 79-year-old male, past medical history significant for hyperlipidemia, CKD, communication disorder, presenting for evaluation of a fall.  Patient resides at [facility] and was reportedly found down today in his room.  He does have a abrasion to his posterior scalp and suspected fall with head strike.  Patient is not on any blood thinning medications.  Fall was unwitnessed, no known loss of consciousness.  Patient denies any symptoms prior to or after event, but  does have dementia and is a poor historian.  He does report some dizziness, but states that he is dizzy at baseline which was also reported by EMS.  Otherwise denies any current complaints including but not limited to weakness, sensation changes, blurry vision, or pain anywhere.  Attempted to contact Lance Camilo, however was unable to reach and left a message to return phone call.          Review of Systems   Constitutional:  Negative for chills, fatigue and fever.   HENT:  Negative for congestion.    Respiratory:  Negative for cough, chest tightness and shortness of breath.    Cardiovascular:  Negative for chest pain.   Gastrointestinal:  Negative for abdominal pain, diarrhea, nausea and vomiting.   Genitourinary:  Negative for difficulty urinating.   Musculoskeletal:  Negative for back pain and neck pain.   Skin:  Negative for color change.   Neurological:  Positive for dizziness. Negative for syncope, weakness, numbness and headaches.           Objective       ED Triage Vitals   Temperature Pulse Blood Pressure Respirations SpO2 Patient Position - Orthostatic VS   09/30/24 1712 09/30/24 1710 09/30/24 1710 09/30/24 1710 09/30/24 1710 09/30/24 1710   98.9 °F (37.2 °C) 60 158/85 17 97 % Lying      Temp Source Heart Rate Source BP Location FiO2 (%) Pain Score    09/30/24 1712 09/30/24 1710 09/30/24 1710 -- 09/30/24 1710    Oral Monitor Left arm  No Pain      Vitals      Date and Time Temp Pulse SpO2 Resp BP Pain Score FACES Pain Rating User   09/30/24 1712 98.9 °F (37.2 °C) -- -- -- -- -- -- TW   09/30/24 1710 -- 60 97 % 17 158/85 No Pain -- TW            Physical Exam  Vitals and nursing note reviewed.   Constitutional:       General: He is not in acute distress.     Appearance: Normal appearance. He is not ill-appearing or toxic-appearing.   HENT:      Head: Normocephalic and atraumatic.   Eyes:      General: No scleral icterus.     Extraocular Movements: Extraocular movements intact.   Cardiovascular:       Rate and Rhythm: Normal rate and regular rhythm.      Pulses: Normal pulses.      Heart sounds: Normal heart sounds. No murmur heard.  Pulmonary:      Effort: Pulmonary effort is normal. No respiratory distress.      Breath sounds: Normal breath sounds. No wheezing or rhonchi.   Abdominal:      General: Abdomen is flat. There is no distension.      Palpations: Abdomen is soft.      Tenderness: There is no abdominal tenderness.   Musculoskeletal:         General: No swelling, tenderness or deformity. Normal range of motion.      Cervical back: Normal range of motion.      Right lower leg: No edema.      Left lower leg: No edema.   Skin:     General: Skin is warm.      Capillary Refill: Capillary refill takes less than 2 seconds.      Comments: 4 cm horizontal abrasion to the occipital scalp.  No gaping, no active bleeding.   Neurological:      General: No focal deficit present.      Mental Status: He is alert.      GCS: GCS eye subscore is 4. GCS verbal subscore is 5. GCS motor subscore is 6.      Cranial Nerves: No cranial nerve deficit or dysarthria.      Sensory: Sensation is intact. No sensory deficit.      Motor: Motor function is intact. No weakness.      Coordination: Coordination is intact.      Comments: Patient is oriented to self and place and knows that he is here because of a fall.  But he is unable to give further accurate details about the fall.  Disoriented to time.  Appears to be at baseline.   Psychiatric:         Mood and Affect: Mood normal.         Behavior: Behavior normal.         Results Reviewed       Procedure Component Value Units Date/Time    Comprehensive metabolic panel [[id number]]  (Abnormal) Collected: 09/30/24 1843    Lab Status: Final result Specimen: Blood from Arm, Left Updated: 09/30/24 1911     Sodium 139 mmol/L      Potassium 4.5 mmol/L      Chloride 107 mmol/L      CO2 26 mmol/L      ANION GAP 6 mmol/L      BUN 27 mg/dL      Creatinine 1.63 mg/dL      Glucose 93 mg/dL       Calcium 8.6 mg/dL      AST 19 U/L      ALT 15 U/L      Alkaline Phosphatase 65 U/L      Total Protein 6.1 g/dL      Albumin 4.1 g/dL      Total Bilirubin 0.34 mg/dL      eGFR 40 ml/min/1.73sq m     Narrative:      National Kidney Disease Foundation guidelines for Chronic Kidney Disease (CKD):     Stage 1 with normal or high GFR (GFR > 90 mL/min/1.73 square meters)    Stage 2 Mild CKD (GFR = 60-89 mL/min/1.73 square meters)    Stage 3A Moderate CKD (GFR = 45-59 mL/min/1.73 square meters)    Stage 3B Moderate CKD (GFR = 30-44 mL/min/1.73 square meters)    Stage 4 Severe CKD (GFR = 15-29 mL/min/1.73 square meters)    Stage 5 End Stage CKD (GFR <15 mL/min/1.73 square meters)  Note: GFR calculation is accurate only with a steady state creatinine    CK [[id number]]  (Normal) Collected: 09/30/24 1843    Lab Status: Final result Specimen: Blood from Arm, Left Updated: 09/30/24 1911     Total  U/L     CBC and differential [[id number]]  (Abnormal) Collected: 09/30/24 1843    Lab Status: Final result Specimen: Blood from Arm, Left Updated: 09/30/24 1857     WBC 6.77 Thousand/uL      RBC 3.26 Million/uL      Hemoglobin 10.5 g/dL      Hematocrit 31.6 %      MCV 97 fL      MCH 32.2 pg      MCHC 33.2 g/dL      RDW 12.0 %      MPV 10.8 fL      Platelets 131 Thousands/uL      nRBC 0 /100 WBCs      Segmented % 70 %      Immature Grans % 0 %      Lymphocytes % 19 %      Monocytes % 10 %      Eosinophils Relative 1 %      Basophils Relative 0 %      Absolute Neutrophils 4.66 Thousands/µL      Absolute Immature Grans 0.02 Thousand/uL      Absolute Lymphocytes 1.31 Thousands/µL      Absolute Monocytes 0.67 Thousand/µL      Eosinophils Absolute 0.08 Thousand/µL      Basophils Absolute 0.03 Thousands/µL             CT head without contrast   Final Interpretation by E. Alec Schoenberger, MD (09/30 1902)      No acute intracranial abnormality.                  Workstation performed: [id number]         CT spine cervical without contrast    Final Interpretation by E. Alec Schoenberger, MD (09/30 1902)      No cervical spine fracture or traumatic malalignment.                  Workstation performed: [id number]             Procedures    ED Medication and Procedure Management   Prior to Admission Medications   Prescriptions Last Dose Informant Patient Reported? Taking?   acetaminophen (TYLENOL) 325 mg tablet   No No   Sig: Take 3 tablets (975 mg total) by mouth every 8 (eight) hours as needed for mild pain   atorvastatin (LIPITOR) 10 mg tablet   Yes No   Sig: Take 10 mg by mouth daily   meclizine (ANTIVERT) 25 mg tablet   No No   Sig: Take 1 tablet (25 mg total) by mouth every 8 (eight) hours as needed for dizziness for up to 30 doses   sertraline (ZOLOFT) 25 mg tablet  Self Yes No   Sig: Take 25 mg by mouth 2 (two) times a day before lunch and dinner      Facility-Administered Medications: None     Discharge Medication List as of 9/30/2024  7:14 PM        CONTINUE these medications which have NOT CHANGED    Details   acetaminophen (TYLENOL) 325 mg tablet Take 3 tablets (975 mg total) by mouth every 8 (eight) hours as needed for mild pain, Starting Fri 8/12/2022, No Print      atorvastatin (LIPITOR) 10 mg tablet Take 10 mg by mouth daily, Historical Med      meclizine (ANTIVERT) 25 mg tablet Take 1 tablet (25 mg total) by mouth every 8 (eight) hours as needed for dizziness for up to 30 doses, Starting Sat 7/8/2023, Normal      sertraline (ZOLOFT) 25 mg tablet Take 25 mg by mouth 2 (two) times a day before lunch and dinner, Historical Med           No discharge procedures on file.  ED SEPSIS DOCUMENTATION   Time reflects when diagnosis was documented in both MDM as applicable and the Disposition within this note       Time User Action Codes Description Comment    9/30/2024  7:14 PM Maryam Asencio Add [W19.XXXA] Fall, initial encounter                  Maryam Asencio MD  10/01/24 5504

## 2024-10-23 ENCOUNTER — APPOINTMENT (EMERGENCY)
Dept: RADIOLOGY | Facility: HOSPITAL | Age: 79
End: 2024-10-23
Payer: COMMERCIAL

## 2024-10-23 ENCOUNTER — HOSPITAL ENCOUNTER (EMERGENCY)
Facility: HOSPITAL | Age: 79
Discharge: HOME | End: 2024-10-23
Attending: EMERGENCY MEDICINE
Payer: COMMERCIAL

## 2024-10-23 VITALS
OXYGEN SATURATION: 96 % | HEART RATE: 63 BPM | RESPIRATION RATE: 18 BRPM | DIASTOLIC BLOOD PRESSURE: 60 MMHG | TEMPERATURE: 98.9 F | SYSTOLIC BLOOD PRESSURE: 118 MMHG

## 2024-10-23 DIAGNOSIS — S09.90XA INJURY OF HEAD, INITIAL ENCOUNTER: ICD-10-CM
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
## 2024-10-23 LAB
ATRIAL RATE: 57 BPM
BACTERIA UR QL AUTO: ABNORMAL /HPF
BILIRUB UR QL STRIP: NEGATIVE
CLARITY UR: CLEAR
COLOR UR: ABNORMAL
GLUCOSE UR STRIP-MCNC: NEGATIVE MG/DL
HGB UR QL STRIP.AUTO: ABNORMAL
HYALINE CASTS #/AREA URNS LPF: ABNORMAL /LPF
KETONES UR STRIP-MCNC: NEGATIVE MG/DL
LEUKOCYTE ESTERASE UR QL STRIP: NEGATIVE
NITRITE UR QL STRIP: NEGATIVE
NON-SQ EPI CELLS URNS QL MICRO: ABNORMAL /HPF
PH UR STRIP.AUTO: 5 [PH]
PROT UR STRIP-MCNC: NEGATIVE MG/DL
QRS AXIS: 41 DEGREES
QRSD INTERVAL: 136 MS
QT INTERVAL: 432 MS
QTC INTERVAL: 424 MS
RBC #/AREA URNS AUTO: ABNORMAL /HPF
SP GR UR STRIP.AUTO: 1.02 (ref 1–1.03)
T WAVE AXIS: 40 DEGREES
UROBILINOGEN UR STRIP-ACNC: <2 MG/DL
VENTRICULAR RATE: 58 BPM
WBC #/AREA URNS AUTO: ABNORMAL /HPF

## 2024-10-23 PROCEDURE — 81001 URINALYSIS AUTO W/SCOPE: CPT

## 2024-10-23 PROCEDURE — 99284 EMERGENCY DEPT VISIT MOD MDM: CPT

## 2024-10-23 PROCEDURE — 72125 CT NECK SPINE W/O DYE: CPT

## 2024-10-23 PROCEDURE — 93010 ELECTROCARDIOGRAM REPORT: CPT | Performed by: INTERNAL MEDICINE

## 2024-10-23 PROCEDURE — 70450 CT HEAD/BRAIN W/O DYE: CPT

## 2024-10-23 PROCEDURE — 93005 ELECTROCARDIOGRAM TRACING: CPT

## 2024-10-23 PROCEDURE — 99284 EMERGENCY DEPT VISIT MOD MDM: CPT | Performed by: EMERGENCY MEDICINE

## 2024-10-23 NOTE — ED ATTENDING ATTESTATION
10/23/2024  I, Luisito Coker DO, saw and evaluated the patient. I have discussed the patient with the resident/non-physician practitioner and agree with the resident's/non-physician practitioner's findings, Plan of Care, and MDM as documented in the resident's/non-physician practitioner's note, except where noted. All available labs and Radiology studies were reviewed.  I was present for key portions of any procedure(s) performed by the resident/non-physician practitioner and I was immediately available to provide assistance.       At this point I agree with the current assessment done in the Emergency Department.  I have conducted an independent evaluation of this patient a history and physical is as follows:    79-year-old male presents from [facility] for evaluation of closed injury following nonsyncopal fall from sitting at ground-level.  Has an abrasion to the right side of his head no use of anticoagulants or antiplatelet agents.  Offers no complaints this time.    Impression: Closed injury plan: CT head, C-spine, EKG, likely discharge back to facility.      ED Course         Critical Care Time  Procedures

## 2024-10-23 NOTE — DISCHARGE INSTRUCTIONS
You were seen in the ED for head injury after fall.  Your CTs did not show any acute abnormalities.    Your urine did not show any infection.    Please return to ED if worsening headache, confusion, blurred vision, double vision, focal motor weakness.

## 2024-10-23 NOTE — ED PROVIDER NOTES
Time reflects when diagnosis was documented in both MDM as applicable and the Disposition within this note       Time User Action Codes Description Comment    10/23/2024  5:26 PM Xochitl Arndt Add [W19.XXXA] Fall, initial encounter     10/23/2024  5:27 PM Xochitl Arndt Add [S09.90XA] Injury of head, initial encounter           ED Disposition       ED Disposition   Discharge    Condition   Stable    Date/Time   Wed Oct 23, 2024  4:18 PM    Comment   Usman S Jenelle discharge to home/self care.                   Assessment & Plan       Medical Decision Making  Amount and/or Complexity of Data Reviewed  Labs:  Decision-making details documented in ED Course.  Radiology: ordered. Decision-making details documented in ED Course.      Patient is 79-year-old male with past medical history of dementia, CKD presenting to ED for evaluation after fall. See history and physical below.       Impression: closed head injury, doubt intracranial hemorrhage or cervical fracture. Plan: CT head and neck, EKG. Likely discharge home.   View ED course above for further discussion on patient workup.     On my interpretation of EKG   All labs reviewed and utilized in the medical decision making process  All radiology studies independently viewed by me and interpreted by the radiologist.  I reviewed all testing with the patient.     Upon re-evaluation patient remains stable in ED. Will d/c back to [facility].          ED Course as of 10/26/24 2331   Wed Oct 23, 2024   1613 CT head without contrast    IMPRESSION:     No acute intracranial abnormality.     1618 CT cervical spine without contrast  IMPRESSION:     No cervical spine fracture or traumatic malalignment.        1726 RBC Urine(!): 4-10   1726 WBC, UA: None Seen   1726 Bacteria, UA: None Seen   1726 Leukocytes, UA: Negative   1726 Nitrite, UA: Negative       Medications - No data to display    ED Risk Strat Scores                           SBIRT 20yo+      Flowsheet Row  Most Recent Value   Initial Alcohol Screen: US AUDIT-C     1. How often do you have a drink containing alcohol? 0 Filed at: 10/23/2024 1448   2. How many drinks containing alcohol do you have on a typical day you are drinking?  0 Filed at: 10/23/2024 1448   3b. FEMALE Any Age, or MALE 65+: How often do you have 4 or more drinks on one occassion? 0 Filed at: 10/23/2024 1448   Audit-C Score 0 Filed at: 10/23/2024 1448   WILFREDO: How many times in the past year have you...    Used an illegal drug or used a prescription medication for non-medical reasons? Never Filed at: 10/23/2024 144                            History of Present Illness       Chief Complaint   Patient presents with    Fall     Coming from [facility], pt fell and hit his head on the bedside table. -LOC - thinners; Staff also reports pt having increased episodes of dizziness after he had some medication changes. Pt has no complaints at this time       Past Medical History:   Diagnosis Date    Anemia     Anxiety     Bilateral inguinal hernia 2022    Evaluated @ [facility]    Bronchitis     Chronic kidney disease     ckd 3    Cognitive communication disorder     Colitis     Hyperlipidemia     Pre-diabetes     Trauma     mechanical fall down stairs      Past Surgical History:   Procedure Laterality Date    CATARACT EXTRACTION      COLONOSCOPY      COLONOSCOPY W/ BIOPSIES  2001    Tabitha Thomas MD    MOHS RECONSTRUCTION      basal ca    OK RPR 1ST INGUN HRNA AGE 5 YRS/> REDUCIBLE Bilateral 8/10/2022    Procedure: REPAIR HERNIA INGUINAL;  Surgeon: Rajat Silva MD;  Location: [facility];  Service: General      History reviewed. No pertinent family history.   Social History     Tobacco Use    Smoking status: Former     Current packs/day: 0.00     Types: Cigarettes     Start date:      Quit date:      Years since quittin.8    Smokeless tobacco: Never   Vaping Use    Vaping status: Never Used   Substance Use Topics    Alcohol use: Yes      Comment: rare    Drug use: Not Currently      E-Cigarette/Vaping    E-Cigarette Use Never User       E-Cigarette/Vaping Substances    Nicotine No     THC No     CBD No     Flavoring No     Other No     Unknown No       I have reviewed and agree with the history as documented.     HPI  Patient is 79-year-old male with past medical history of dementia, CKD presenting to ED for evaluation after fall. Patient presents from [facility] after fall. Per EMS facility concerned due to recent reported dizziness. Patient reports unsure how he fell. Endorses intermittent dizziness but unable to describe. Patient denies headache, neck pain, chest pain, abdominal pain, n/v/d, urinary complaints.  No reported anticoagulation or antiplatelets.    Patient poor historian due to baseline dementia.   Review of Systems        Objective       ED Triage Vitals [10/23/24 1445]   Temperature Pulse Blood Pressure Respirations SpO2 Patient Position - Orthostatic VS   98.9 °F (37.2 °C) 63 118/60 18 96 % Lying      Temp Source Heart Rate Source BP Location FiO2 (%) Pain Score    Oral Monitor Left arm -- --      Vitals      Date and Time Temp Pulse SpO2 Resp BP Pain Score FACES Pain Rating User   10/23/24 1445 98.9 °F (37.2 °C) 63 96 % 18 118/60 -- -- JS            Physical Exam  Vitals reviewed.   Constitutional:       General: He is awake.   HENT:      Head: Normocephalic and atraumatic.      Mouth/Throat:      Mouth: Mucous membranes are moist.   Eyes:      Extraocular Movements: Extraocular movements intact.      Right eye: No nystagmus.      Left eye: No nystagmus.      Conjunctiva/sclera: Conjunctivae normal.      Pupils: Pupils are equal, round, and reactive to light.   Cardiovascular:      Rate and Rhythm: Normal rate and regular rhythm.      Pulses: Normal pulses.      Heart sounds: Normal heart sounds, S1 normal and S2 normal. Heart sounds not distant. No murmur heard.     No friction rub. No gallop.   Pulmonary:       Breath sounds: No stridor. No wheezing, rhonchi or rales.      Comments: CTA b/l   Abdominal:      General: Bowel sounds are normal.      Palpations: Abdomen is soft.      Tenderness: There is no abdominal tenderness.   Musculoskeletal:      Right lower leg: No edema.      Left lower leg: No edema.   Skin:     General: Skin is warm and dry.      Capillary Refill: Capillary refill takes less than 2 seconds.   Neurological:      Mental Status: He is alert.      GCS: GCS eye subscore is 4. GCS verbal subscore is 4. GCS motor subscore is 6.      Cranial Nerves: Cranial nerves 2-12 are intact.      Sensory: Sensation is intact.      Motor: No weakness or pronator drift.      Comments: Oriented to self, place, time.          Results Reviewed       Procedure Component Value Units Date/Time    Urine Microscopic [[id number]]  (Abnormal) Collected: 10/23/24 1640    Lab Status: Final result Specimen: Urine, Straight Cath Updated: 10/23/24 1710     RBC, UA 4-10 /hpf      WBC, UA None Seen /hpf      Epithelial Cells Occasional /hpf      Bacteria, UA None Seen /hpf      Hyaline Casts, UA 0-3 /lpf     UA w Reflex to Microscopic w Reflex to Culture [[id number]]  (Abnormal) Collected: 10/23/24 1640    Lab Status: Final result Specimen: Urine, Straight Cath Updated: 10/23/24 1652     Color, UA Light Yellow     Clarity, UA Clear     Specific Gravity, UA 1.018     pH, UA 5.0     Leukocytes, UA Negative     Nitrite, UA Negative     Protein, UA Negative mg/dl      Glucose, UA Negative mg/dl      Ketones, UA Negative mg/dl      Urobilinogen, UA <2.0 mg/dl      Bilirubin, UA Negative     Occult Blood, UA Small            CT head without contrast   Final Interpretation by Arturo Garrido MD (10/23 1609)      No acute intracranial abnormality.                  Workstation performed: [id number]         CT cervical spine without contrast   Final Interpretation by Arturo Garrido MD (10/23 1613)      No cervical spine  fracture or traumatic malalignment.                  Workstation performed: [id number]             Procedures    ED Medication and Procedure Management   Prior to Admission Medications   Prescriptions Last Dose Informant Patient Reported? Taking?   acetaminophen (TYLENOL) 325 mg tablet   No No   Sig: Take 3 tablets (975 mg total) by mouth every 8 (eight) hours as needed for mild pain   atorvastatin (LIPITOR) 10 mg tablet   Yes No   Sig: Take 10 mg by mouth daily   meclizine (ANTIVERT) 25 mg tablet   No No   Sig: Take 1 tablet (25 mg total) by mouth every 8 (eight) hours as needed for dizziness for up to 30 doses   sertraline (ZOLOFT) 25 mg tablet  Self Yes No   Sig: Take 25 mg by mouth 2 (two) times a day before lunch and dinner      Facility-Administered Medications: None     Discharge Medication List as of 10/23/2024  5:27 PM        CONTINUE these medications which have NOT CHANGED    Details   acetaminophen (TYLENOL) 325 mg tablet Take 3 tablets (975 mg total) by mouth every 8 (eight) hours as needed for mild pain, Starting Fri 8/12/2022, No Print      atorvastatin (LIPITOR) 10 mg tablet Take 10 mg by mouth daily, Historical Med      meclizine (ANTIVERT) 25 mg tablet Take 1 tablet (25 mg total) by mouth every 8 (eight) hours as needed for dizziness for up to 30 doses, Starting Sat 7/8/2023, Normal      sertraline (ZOLOFT) 25 mg tablet Take 25 mg by mouth 2 (two) times a day before lunch and dinner, Historical Med           No discharge procedures on file.  ED SEPSIS DOCUMENTATION   Time reflects when diagnosis was documented in both MDM as applicable and the Disposition within this note       Time User Action Codes Description Comment    10/23/2024  5:26 PM Xochitl Arndt [W19.XXXA] Fall, initial encounter     10/23/2024  5:27 PM Xochitl Arndt [S09.90XA] Injury of head, initial encounter                  Xochitl Arndt DO  10/26/24 5006

## 2024-11-13 ENCOUNTER — TRANSCRIBE ORDERS (OUTPATIENT)
Dept: LAB | Facility: CLINIC | Age: 79
End: 2024-11-13

## 2024-11-13 DIAGNOSIS — N39.0 URINARY TRACT INFECTION WITHOUT HEMATURIA, SITE UNSPECIFIED: Primary | ICD-10-CM

## 2024-11-22 LAB
ALBUMIN SERPL-MCNC: 4.3 G/DL (ref 3.5–5.7)
ALP SERPL-CCNC: 70 U/L (ref 35–120)
ALT SERPL-CCNC: 12 U/L
ANION GAP SERPL CALCULATED.3IONS-SCNC: 9 MMOL/L (ref 3–11)
AST SERPL-CCNC: 17 U/L
BASOPHILS # BLD AUTO: 0 THOU/CMM (ref 0–0.1)
BASOPHILS NFR BLD AUTO: 0 %
BILIRUB SERPL-MCNC: 0.6 MG/DL (ref 0.2–1)
BUN SERPL-MCNC: 26 MG/DL (ref 7–28)
CALCIUM SERPL-MCNC: 9 MG/DL (ref 8.5–10.5)
CHLORIDE SERPL-SCNC: 107 MMOL/L (ref 100–109)
CO2 SERPL-SCNC: 26 MMOL/L (ref 21–31)
CREAT SERPL-MCNC: 1.53 MG/DL (ref 0.53–1.3)
CYTOLOGY CMNT CVX/VAG CYTO-IMP: ABNORMAL
DIFFERENTIAL METHOD BLD: ABNORMAL
EOSINOPHIL # BLD AUTO: 0.1 THOU/CMM (ref 0–0.5)
EOSINOPHIL NFR BLD AUTO: 1 %
ERYTHROCYTE [DISTWIDTH] IN BLOOD BY AUTOMATED COUNT: 13.2 % (ref 12–16)
GFR/BSA.PRED SERPLBLD CYS-BASED-ARV: 46 ML/MIN/{1.73_M2}
GLUCOSE SERPL-MCNC: 90 MG/DL (ref 65–99)
HCT VFR BLD AUTO: 33.2 % (ref 37–48)
HGB BLD-MCNC: 11.3 G/DL (ref 12.5–17)
LYMPHOCYTES # BLD AUTO: 1.7 THOU/CMM (ref 1–3)
LYMPHOCYTES NFR BLD AUTO: 29 %
MCH RBC QN AUTO: 32.5 PG (ref 27–36)
MCHC RBC AUTO-ENTMCNC: 33.9 G/DL (ref 32–37)
MCV RBC AUTO: 96 FL (ref 80–100)
MONOCYTES # BLD AUTO: 0.6 THOU/CMM (ref 0.3–1)
MONOCYTES NFR BLD AUTO: 11 %
MORPHOLOGY BLD-IMP: ABNORMAL
MYELOCYTES NFR BLD MANUAL: 1 %
NEUTROPHILS # BLD AUTO: 3.6 THOU/CMM (ref 1.8–7.8)
NEUTROPHILS NFR BLD AUTO: 58 %
OVALOCYTES BLD QL SMEAR: SLIGHT
PLATELET # BLD AUTO: 129 THOU/CMM (ref 140–350)
PMV BLD REES-ECKER: 8.8 FL (ref 7.5–11.3)
POIKILOCYTOSIS BLD QL SMEAR: ABNORMAL
POTASSIUM SERPL-SCNC: 4.1 MMOL/L (ref 3.5–5.2)
PROT SERPL-MCNC: 6.7 G/DL (ref 6.3–8.3)
RBC # BLD AUTO: 3.47 MILL/CMM (ref 4–5.4)
SODIUM SERPL-SCNC: 142 MMOL/L (ref 135–145)
WBC # BLD AUTO: 5.9 THOU/CMM (ref 4–10.5)

## 2024-11-25 ENCOUNTER — TELEPHONE (OUTPATIENT)
Age: 79
End: 2024-11-25

## 2024-11-25 NOTE — TELEPHONE ENCOUNTER
Lilly from [facility] called.  States that pt's 1/21/25 appt needs to be rescheduled since pt's POA is unavailable to come for that appt that day or any day that week.    No available appt times found as pt is a new pt, that are available prior to Care Conference appt on 2/25/25.  Please review and advise how to proceed.    Please give Lilly a call back at [facility] at [phone] and ask to be transferred to the Wellness Department.

## 2024-12-11 ENCOUNTER — APPOINTMENT (EMERGENCY)
Dept: RADIOLOGY | Facility: HOSPITAL | Age: 79
End: 2024-12-11
Payer: COMMERCIAL

## 2024-12-11 ENCOUNTER — HOSPITAL ENCOUNTER (EMERGENCY)
Facility: HOSPITAL | Age: 79
Discharge: HOME | End: 2024-12-11
Attending: EMERGENCY MEDICINE
Payer: COMMERCIAL

## 2024-12-11 VITALS
HEART RATE: 63 BPM | RESPIRATION RATE: 18 BRPM | DIASTOLIC BLOOD PRESSURE: 64 MMHG | OXYGEN SATURATION: 96 % | TEMPERATURE: 98.4 F | SYSTOLIC BLOOD PRESSURE: 134 MMHG
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
## 2024-12-11 PROCEDURE — 72125 CT NECK SPINE W/O DYE: CPT

## 2024-12-11 PROCEDURE — 99285 EMERGENCY DEPT VISIT HI MDM: CPT | Performed by: EMERGENCY MEDICINE

## 2024-12-11 PROCEDURE — 99284 EMERGENCY DEPT VISIT MOD MDM: CPT

## 2024-12-11 PROCEDURE — 73521 X-RAY EXAM HIPS BI 2 VIEWS: CPT

## 2024-12-11 PROCEDURE — 70450 CT HEAD/BRAIN W/O DYE: CPT

## 2024-12-11 NOTE — ED ATTENDING ATTESTATION
12/11/2024  IKavin MD, saw and evaluated the patient. I have discussed the patient with the resident/non-physician practitioner and agree with the resident's/non-physician practitioner's findings, Plan of Care, and MDM as documented in the resident's/non-physician practitioner's note, except where noted. All available labs and Radiology studies were reviewed.  I was present for key portions of any procedure(s) performed by the resident/non-physician practitioner and I was immediately available to provide assistance.       At this point I agree with the current assessment done in the Emergency Department.  I have conducted an independent evaluation of this patient a history and physical is as follows:      Final Diagnosis:  1. Fall, initial encounter      Chief Complaint   Patient presents with    Fall     Patient coming from [facility] locked dementia unit, staff found patient on the floor,+HS, -thinners or ASA, -LOC patient has hematoma on back of head, c/o bilateral hip pain and a headache           A:  -79-year-old male who presents as an unwitnessed fall from the dementia unit.      P:  -CT head and C-spine to evaluate for traumatic injury.  -Pelvis x-ray to evaluate for fracture.  -Ambulate.      H:    79-year-old male who presents with an unwitnessed fall from the dementia unit.  Does not take any blood thinners.      PMH:  Past Medical History:   Diagnosis Date    Anemia     Anxiety     Bilateral inguinal hernia 04/23/2022    Evaluated @ [facility]    Bronchitis     Chronic kidney disease     ckd 3    Cognitive communication disorder     Colitis     Hyperlipidemia     Pre-diabetes     Trauma     mechanical fall down stairs       PSH:  Past Surgical History:   Procedure Laterality Date    CATARACT EXTRACTION      COLONOSCOPY      COLONOSCOPY W/ BIOPSIES  11/12/2001    Tabitha Thomas MD    MOHS RECONSTRUCTION      basal ca    [place] RPR 1ST INGUN HRNA AGE 5 YRS/> REDUCIBLE Bilateral 8/10/2022    Procedure:  REPAIR HERNIA INGUINAL;  Surgeon: Rajat Silva MD;  Location: [facility];  Service: General         PE:   Vitals:    12/11/24 0231   BP: 134/64   BP Location: Right arm   Pulse: 58   Resp: 20   Temp: 98.4 °F (36.9 °C)   TempSrc: Oral   SpO2: 96%         Constitutional: Vital signs are normal. He appears well-developed. He is cooperative. No distress.   Head: Small hematoma to occiput.  Neck: No C-spine tenderness.  Pulmonary/Chest: Effort normal.   Abdominal: Normal appearance.   Neurological: He is alert.  Skin: Skin is warm, dry and intact.           - 13 point ROS was performed and all are normal unless stated in the history above.   - Nursing note reviewed. Vitals reviewed.   - Orders placed by myself and/or advanced practitioner / resident.    - Previous chart was reviewed  - No language barrier.   - History obtained from patient.   - There are no limitations to the history obtained. Reasons ROS could not be obtained: dementia         Medications - No data to display  XR hips bilateral with ap pelvis 2 vw   ED Interpretation   No acute osseous abnormality as interpreted by myself.      CT head without contrast   Final Result      No acute intracranial abnormality.                  Workstation performed: [id number]         CT cervical spine without contrast   Final Result      No cervical spine fracture or traumatic malalignment.                  Workstation performed: [id number]           Orders Placed This Encounter   Procedures    CT head without contrast    CT cervical spine without contrast    XR hips bilateral with ap pelvis 2 vw     Labs Reviewed - No data to display  Time reflects when diagnosis was documented in both MDM as applicable and the Disposition within this note       Time User Action Codes Description Comment    12/11/2024  4:01 AM Adrien Greene Add [W19.XXXA] Fall, initial encounter           ED Disposition       ED Disposition   Discharge    Condition   Stable    Date/Time   Wed Dec 11,  "2024  4:01 AM    Comment   Usman Almanzar discharge to home/self care.                   Follow-up Information       Follow up With Specialties Details Why Contact Info Additional Information    [facility] Emergency Department Emergency Medicine Go to  If symptoms worsen [address] [facility] [address]  [phone] [facility] Emergency Department, [address], [place], [address]   [phone]    Julee Watson, DO Internal Medicine Go to  If symptoms worsen [address] [address]  [phone]             Patient's Medications   Discharge Prescriptions    No medications on file     No discharge procedures on file.  Prior to Admission Medications   Prescriptions Last Dose Informant Patient Reported? Taking?   acetaminophen (TYLENOL) 325 mg tablet   No No   Sig: Take 3 tablets (975 mg total) by mouth every 8 (eight) hours as needed for mild pain   atorvastatin (LIPITOR) 10 mg tablet   Yes No   Sig: Take 10 mg by mouth daily   meclizine (ANTIVERT) 25 mg tablet   No No   Sig: Take 1 tablet (25 mg total) by mouth every 8 (eight) hours as needed for dizziness for up to 30 doses   sertraline (ZOLOFT) 25 mg tablet  Self Yes No   Sig: Take 25 mg by mouth 2 (two) times a day before lunch and dinner      Facility-Administered Medications: None       Portions of the record may have been created with voice recognition software. Occasional wrong word or \"sound a like\" substitutions may have occurred due to the inherent limitations of voice recognition software. Read the chart carefully and recognize, using context, where substitutions have occurred.       ED Course         Critical Care Time  Procedures      "

## 2024-12-11 NOTE — ED PROVIDER NOTES
Time reflects when diagnosis was documented in both MDM as applicable and the Disposition within this note       Time User Action Codes Description Comment    12/11/2024  4:01 AM RamonaAdrien tran Add [W19.XXXA] Fall, initial encounter           ED Disposition       ED Disposition   Discharge    Condition   Stable    Date/Time   Wed Dec 11, 2024  4:01 AM    Comment   Usman S Jenelle discharge to home/self care.                   Assessment & Plan       Medical Decision Making  Patient is 79-year-old male presenting for unwitnessed fall.  DDx: Fall, rule out ICH, C-spine fracture, hip fracture  Based on patient presentation and physical exam findings, primary concern is for evaluation of sequelae from fall.  Plan for CT head and C-spine hip x-ray.  Imaging then for any acute abnormalities.  Plan for discharge back to facility.  Ready for discharge.    Problems Addressed:  Fall, initial encounter: acute illness or injury    Amount and/or Complexity of Data Reviewed  Radiology: ordered and independent interpretation performed.             Medications - No data to display    ED Risk Strat Scores                           SBIRT 22yo+      Flowsheet Row Most Recent Value   Initial Alcohol Screen: US AUDIT-C     1. How often do you have a drink containing alcohol? 0 Filed at: 12/11/2024 0240   2. How many drinks containing alcohol do you have on a typical day you are drinking?  0 Filed at: 12/11/2024 0240   3b. FEMALE Any Age, or MALE 65+: How often do you have 4 or more drinks on one occassion? 0 Filed at: 12/11/2024 0240   Audit-C Score 0 Filed at: 12/11/2024 0240   WILFREDO: How many times in the past year have you...    Used an illegal drug or used a prescription medication for non-medical reasons? Never Filed at: 12/11/2024 0240                            History of Present Illness       Chief Complaint   Patient presents with    Fall     Patient coming from [facility] locked dementia unit, staff found patient on the floor,+HS,  -thinners or ASA, -LOC patient has hematoma on back of head, c/o bilateral hip pain and a headache       Past Medical History:   Diagnosis Date    Anemia     Anxiety     Bilateral inguinal hernia 2022    Evaluated @ [facility] ER    Bronchitis     Chronic kidney disease     ckd 3    Cognitive communication disorder     Colitis     Hyperlipidemia     Pre-diabetes     Trauma     mechanical fall down stairs      Past Surgical History:   Procedure Laterality Date    CATARACT EXTRACTION      COLONOSCOPY      COLONOSCOPY W/ BIOPSIES  2001    Tabitha Thomas MD    MOHS RECONSTRUCTION      basal ca    [place] RPR 1ST INGUN HRNA AGE 5 YRS/> REDUCIBLE Bilateral 8/10/2022    Procedure: REPAIR HERNIA INGUINAL;  Surgeon: Rajat Silva MD;  Location: [facility];  Service: General      No family history on file.   Social History     Tobacco Use    Smoking status: Former     Current packs/day: 0.00     Types: Cigarettes     Start date:      Quit date:      Years since quittin.9    Smokeless tobacco: Never   Vaping Use    Vaping status: Never Used   Substance Use Topics    Alcohol use: Yes     Comment: rare    Drug use: Not Currently      E-Cigarette/Vaping    E-Cigarette Use Never User       E-Cigarette/Vaping Substances    Nicotine No     THC No     CBD No     Flavoring No     Other No     Unknown No       I have reviewed and agree with the history as documented.     HPI  Patient 79-year-old male presenting for concerns of unwitnessed fall.  Past medical history significant for dementia, patient is from [facility].  Patient was found down by staff, small hematoma on the back of his head and complaining of bilateral hip pain.  Not any thinners or aspirin.  Denies any loss of consciousness however patient is a poor historian.  Has no other complaints.  Review of Systems   Constitutional: Negative.    HENT: Negative.     Eyes: Negative.    Respiratory: Negative.     Cardiovascular:  Negative.    Gastrointestinal: Negative.    Endocrine: Negative.    Genitourinary: Negative.    Musculoskeletal: Negative.    Skin:         Fall with head strike, small hematoma to back of scalp.   Allergic/Immunologic: Negative.    Neurological: Negative.    Hematological: Negative.    Psychiatric/Behavioral: Negative.             Objective       ED Triage Vitals [12/11/24 0231]   Temperature Pulse Blood Pressure Respirations SpO2 Patient Position - Orthostatic VS   98.4 °F (36.9 °C) 58 134/64 20 96 % Lying      Temp Source Heart Rate Source BP Location FiO2 (%) Pain Score    Oral Monitor Right arm -- 4      Vitals      Date and Time Temp Pulse SpO2 Resp BP Pain Score FACES Pain Rating User   12/11/24 0430 -- 63 96 % 18 -- -- -- OB   12/11/24 0231 98.4 °F (36.9 °C) 58 96 % 20 134/64 4 -- [place]            Physical Exam  Vitals and nursing note reviewed.   Constitutional:       Appearance: Normal appearance. He is normal weight.   HENT:      Head: Normocephalic.      Comments: Patient is small superficial hematoma on the posterior aspect of his scalp however no open laceration or abrasion.  Minimal tenderness to palpation.  Patient has no C-spine tenderness, no step-offs no deformities.  Has full range of motion of his neck.     Right Ear: Tympanic membrane, ear canal and external ear normal.      Left Ear: Tympanic membrane, ear canal and external ear normal.      Nose: Nose normal.      Mouth/Throat:      Mouth: Mucous membranes are moist.      Pharynx: Oropharynx is clear.   Eyes:      Extraocular Movements: Extraocular movements intact.      Conjunctiva/sclera: Conjunctivae normal.      Pupils: Pupils are equal, round, and reactive to light.   Cardiovascular:      Rate and Rhythm: Normal rate and regular rhythm.      Pulses: Normal pulses.      Heart sounds: Normal heart sounds.   Pulmonary:      Effort: Pulmonary effort is normal.      Breath sounds: Normal breath sounds.   Abdominal:      General: Abdomen is flat.  Bowel sounds are normal.      Palpations: Abdomen is soft.   Musculoskeletal:         General: Normal range of motion.      Cervical back: Normal range of motion and neck supple.      Comments: Patient has no tenderness to palpation over bilateral greater trochanters.  Full range of motion of both lower extremities.   Skin:     General: Skin is warm and dry.      Capillary Refill: Capillary refill takes less than 2 seconds.   Neurological:      General: No focal deficit present.      Mental Status: He is alert and oriented to person, place, and time.   Psychiatric:         Mood and Affect: Mood normal.         Behavior: Behavior normal.         Thought Content: Thought content normal.         Judgment: Judgment normal.         Results Reviewed       None            XR hips bilateral with ap pelvis 2 vw   ED Interpretation by Kavin Kidd MD (12/11 0312)   No acute osseous abnormality as interpreted by myself.      CT head without contrast   Final Interpretation by Silver Blum MD (12/11 1596)      No acute intracranial abnormality.                  Workstation performed: [id number]         CT cervical spine without contrast   Final Interpretation by Silver Blum MD (12/11 7387)      No cervical spine fracture or traumatic malalignment.                  Workstation performed: [id number]             Procedures    ED Medication and Procedure Management   Prior to Admission Medications   Prescriptions Last Dose Informant Patient Reported? Taking?   acetaminophen (TYLENOL) 325 mg tablet   No No   Sig: Take 3 tablets (975 mg total) by mouth every 8 (eight) hours as needed for mild pain   atorvastatin (LIPITOR) 10 mg tablet   Yes No   Sig: Take 10 mg by mouth daily   meclizine (ANTIVERT) 25 mg tablet   No No   Sig: Take 1 tablet (25 mg total) by mouth every 8 (eight) hours as needed for dizziness for up to 30 doses   sertraline (ZOLOFT) 25 mg tablet  Self Yes No   Sig: Take 25 mg by mouth 2 (two) times a day before  lunch and dinner      Facility-Administered Medications: None     Patient's Medications   Discharge Prescriptions    No medications on file     No discharge procedures on file.  ED SEPSIS DOCUMENTATION   Time reflects when diagnosis was documented in both MDM as applicable and the Disposition within this note       Time User Action Codes Description Comment    12/11/2024  4:01 AM Adrien Greene Add [W19.XXXA] Fall, initial encounter                  Adrien Greene MD  12/11/24 0438

## 2024-12-19 ENCOUNTER — APPOINTMENT (EMERGENCY)
Dept: RADIOLOGY | Facility: HOSPITAL | Age: 79
End: 2024-12-19
Payer: COMMERCIAL

## 2024-12-19 ENCOUNTER — HOSPITAL ENCOUNTER (EMERGENCY)
Facility: HOSPITAL | Age: 79
Discharge: HOME | End: 2024-12-19
Attending: EMERGENCY MEDICINE
Payer: COMMERCIAL

## 2024-12-19 VITALS
TEMPERATURE: 98.9 F | HEART RATE: 62 BPM | OXYGEN SATURATION: 98 % | DIASTOLIC BLOOD PRESSURE: 77 MMHG | RESPIRATION RATE: 18 BRPM | SYSTOLIC BLOOD PRESSURE: 166 MMHG
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
## 2024-12-19 LAB
ALBUMIN SERPL BCG-MCNC: 3.8 G/DL (ref 3.5–5)
ALP SERPL-CCNC: 69 U/L (ref 34–104)
ALT SERPL W P-5'-P-CCNC: 12 U/L (ref 7–52)
ANION GAP SERPL CALCULATED.3IONS-SCNC: 7 MMOL/L (ref 4–13)
AST SERPL W P-5'-P-CCNC: 18 U/L (ref 13–39)
BASOPHILS # BLD AUTO: 0.03 THOUSANDS/ΜL (ref 0–0.1)
BASOPHILS NFR BLD AUTO: 0 % (ref 0–1)
BILIRUB SERPL-MCNC: 0.38 MG/DL (ref 0.2–1)
BUN SERPL-MCNC: 27 MG/DL (ref 5–25)
CALCIUM SERPL-MCNC: 8.9 MG/DL (ref 8.4–10.2)
CHLORIDE SERPL-SCNC: 110 MMOL/L (ref 96–108)
CK SERPL-CCNC: 299 U/L (ref 39–308)
CO2 SERPL-SCNC: 25 MMOL/L (ref 21–32)
CREAT SERPL-MCNC: 1.57 MG/DL (ref 0.6–1.3)
EOSINOPHIL # BLD AUTO: 0.13 THOUSAND/ΜL (ref 0–0.61)
EOSINOPHIL NFR BLD AUTO: 2 % (ref 0–6)
ERYTHROCYTE [DISTWIDTH] IN BLOOD BY AUTOMATED COUNT: 12.7 % (ref 11.6–15.1)
GFR SERPL CREATININE-BSD FRML MDRD: 41 ML/MIN/1.73SQ M
GLUCOSE SERPL-MCNC: 94 MG/DL (ref 65–140)
HCT VFR BLD AUTO: 30.1 % (ref 36.5–49.3)
HGB BLD-MCNC: 9.5 G/DL (ref 12–17)
IMM GRANULOCYTES # BLD AUTO: 0.03 THOUSAND/UL (ref 0–0.2)
IMM GRANULOCYTES NFR BLD AUTO: 0 % (ref 0–2)
LYMPHOCYTES # BLD AUTO: 1.46 THOUSANDS/ΜL (ref 0.6–4.47)
LYMPHOCYTES NFR BLD AUTO: 19 % (ref 14–44)
MCH RBC QN AUTO: 31.6 PG (ref 26.8–34.3)
MCHC RBC AUTO-ENTMCNC: 31.6 G/DL (ref 31.4–37.4)
MCV RBC AUTO: 100 FL (ref 82–98)
MONOCYTES # BLD AUTO: 0.72 THOUSAND/ΜL (ref 0.17–1.22)
MONOCYTES NFR BLD AUTO: 9 % (ref 4–12)
NEUTROPHILS # BLD AUTO: 5.31 THOUSANDS/ΜL (ref 1.85–7.62)
NEUTS SEG NFR BLD AUTO: 70 % (ref 43–75)
NRBC BLD AUTO-RTO: 0 /100 WBCS
PLATELET # BLD AUTO: 131 THOUSANDS/UL (ref 149–390)
PMV BLD AUTO: 10.4 FL (ref 8.9–12.7)
POTASSIUM SERPL-SCNC: 4.3 MMOL/L (ref 3.5–5.3)
PROT SERPL-MCNC: 6.2 G/DL (ref 6.4–8.4)
RBC # BLD AUTO: 3.01 MILLION/UL (ref 3.88–5.62)
SODIUM SERPL-SCNC: 142 MMOL/L (ref 135–147)
WBC # BLD AUTO: 7.68 THOUSAND/UL (ref 4.31–10.16)

## 2024-12-19 PROCEDURE — 73030 X-RAY EXAM OF SHOULDER: CPT

## 2024-12-19 PROCEDURE — 80053 COMPREHEN METABOLIC PANEL: CPT

## 2024-12-19 PROCEDURE — 72125 CT NECK SPINE W/O DYE: CPT

## 2024-12-19 PROCEDURE — 85025 COMPLETE CBC W/AUTO DIFF WBC: CPT

## 2024-12-19 PROCEDURE — 36415 COLL VENOUS BLD VENIPUNCTURE: CPT

## 2024-12-19 PROCEDURE — 99284 EMERGENCY DEPT VISIT MOD MDM: CPT

## 2024-12-19 PROCEDURE — 99284 EMERGENCY DEPT VISIT MOD MDM: CPT | Performed by: EMERGENCY MEDICINE

## 2024-12-19 PROCEDURE — 70450 CT HEAD/BRAIN W/O DYE: CPT

## 2024-12-19 PROCEDURE — 82550 ASSAY OF CK (CPK): CPT

## 2024-12-19 NOTE — ED ATTENDING ATTESTATION
Final Diagnoses:     1. Fall, initial encounter           I, Harlan Whitney MD, saw and evaluated the patient. All available labs and X-rays were ordered by me or the resident / non-physician and have been reviewed by myself. I discussed the patient with the resident / non-physician and agree with the resident's / non-physician practitioner's findings and plan as documented in the resident's / non-physician practicitioner's note, except where noted.   At this point, I agree with the current assessment done in the ED.   I was present during key portions of all procedures performed unless otherwise stated.     HPI:  NURSING TRIAGE:    This is a 79 y.o. male presenting for evaluation of fall.  The patient is demented in the locked dementia unit. He was found down. He was up / normal on initial rounds last night. Unclear down time. Hours vs minutes. Not days.   Patient offers complaints of LEFT shoudler pain as observed by him wincing with moving that arm.  ROS limited 2/2 dementia.   Chief Complaint   Patient presents with    Fall     Fall,u/x downtime.left shoulder pain. No signs of head trauma,denies head or neck pain.       PHYSICAL: ASSESSMENT + PLAN:   Pertinent: LEFT shoudler tenderness (minimally)  No signs of head trauma  No C-spine tenderness  No belly tenderness  Appears well.     General: VS reviewed  Appears in NAD  awake, alert.   Well-nourished, well-developed. Appears stated age.   Head: Normocephalic, atraumatic  Eyes: EOM-I. No diplopia.   No hyphema.   No subconjunctival hemorrhages.  Symmetrical lids.   ENT: Atraumatic external nose and ears.    MMM  No malocclusion. No stridor. No phonation. No drooling. Normal swallowing.   Neck: No JVD.  CV: No pallor noted  No tachycarida  Lungs:   No tachypnea  No respiratory distress  Abd: soft nt nd no rebound/guarding  MSK:   FROM spontaneously except with moving the LEFT brachium, seems to have some discomfort like when placing the blood pressure cuff.    Skin: Dry, intact.   Neuro: Awake, alert, GCS15, CN II-XII grossly intact.   Motor grossly intact.  Psychiatric/Behavioral: interacting normally; appropriate mood/affect.    Exam: deferred    Vitals:    24 0502 24 0601   BP: 166/77    BP Location: Left arm    Pulse: 62    Resp: 18    Temp:  98.9 °F (37.2 °C)   TempSrc:  Oral   SpO2: 98%     CTH given age / possible trauma  R/o acute abnormality, prolonged down time.      There are no obvious limitations to social determinants of care.   Nursing note reviewed.   Vitals reviewed.   Orders placed by myself and/or advanced practitioner / resident.    Previous chart was reviewed  History obtained from: EMS and Patient  Language barrier: None  Limitations to the history obtained: Dementia    Past Medical: Past Surgical:    has a past medical history of Anemia, Anxiety, Bilateral inguinal hernia (2022), Bronchitis, Chronic kidney disease, Cognitive communication disorder, Colitis, Hyperlipidemia, Pre-diabetes, and Trauma.  has a past surgical history that includes Colonoscopy w/ biopsies (2001); Cataract extraction; Colonoscopy; MOHS RECONSTRUCTION; and pr rpr 1st ingun hrna age 7 yrs/> reducible (Bilateral, 8/10/2022).   Social: Cardiac (Echo/Cath)   Social History     Substance and Sexual Activity   Alcohol Use Yes    Comment: rare     Social History     Tobacco Use   Smoking Status Former    Current packs/day: 0.00    Types: Cigarettes    Start date:     Quit date:     Years since quittin.9   Smokeless Tobacco Never     Social History     Substance and Sexual Activity   Drug Use Not Currently    No results found for this or any previous visit.    No results found for this or any previous visit.    No results found for this or any previous visit.     Labs: Imaging:   Labs Reviewed   CBC AND DIFFERENTIAL - Abnormal       Result Value Ref Range Status    WBC 7.68  4.31 - 10.16 Thousand/uL Final    RBC 3.01 (*) 3.88 - 5.62 Million/uL  Final    Hemoglobin 9.5 (*) 12.0 - 17.0 g/dL Final    Hematocrit 30.1 (*) 36.5 - 49.3 % Final     (*) 82 - 98 fL Final    MCH 31.6  26.8 - 34.3 pg Final    MCHC 31.6  31.4 - 37.4 g/dL Final    RDW 12.7  11.6 - 15.1 % Final    MPV 10.4  8.9 - 12.7 fL Final    Platelets 131 (*) 149 - 390 Thousands/uL Final    nRBC 0  /100 WBCs Final    Segmented % 70  43 - 75 % Final    Immature Grans % 0  0 - 2 % Final    Lymphocytes % 19  14 - 44 % Final    Monocytes % 9  4 - 12 % Final    Eosinophils Relative 2  0 - 6 % Final    Basophils Relative 0  0 - 1 % Final    Absolute Neutrophils 5.31  1.85 - 7.62 Thousands/µL Final    Absolute Immature Grans 0.03  0.00 - 0.20 Thousand/uL Final    Absolute Lymphocytes 1.46  0.60 - 4.47 Thousands/µL Final    Absolute Monocytes 0.72  0.17 - 1.22 Thousand/µL Final    Eosinophils Absolute 0.13  0.00 - 0.61 Thousand/µL Final    Basophils Absolute 0.03  0.00 - 0.10 Thousands/µL Final   COMPREHENSIVE METABOLIC PANEL - Abnormal    Sodium 142  135 - 147 mmol/L Final    Potassium 4.3  3.5 - 5.3 mmol/L Final    Chloride 110 (*) 96 - 108 mmol/L Final    CO2 25  21 - 32 mmol/L Final    ANION GAP 7  4 - 13 mmol/L Final    BUN 27 (*) 5 - 25 mg/dL Final    Creatinine 1.57 (*) 0.60 - 1.30 mg/dL Final    Comment: Standardized to IDMS reference method    Glucose 94  65 - 140 mg/dL Final    Comment: If the patient is fasting, the ADA then defines impaired fasting glucose as > 100 mg/dL and diabetes as > or equal to 123 mg/dL.    Calcium 8.9  8.4 - 10.2 mg/dL Final    AST 18  13 - 39 U/L Final    ALT 12  7 - 52 U/L Final    Comment: Specimen collection should occur prior to Sulfasalazine administration due to the potential for falsely depressed results.     Alkaline Phosphatase 69  34 - 104 U/L Final    Total Protein 6.2 (*) 6.4 - 8.4 g/dL Final    Albumin 3.8  3.5 - 5.0 g/dL Final    Total Bilirubin 0.38  0.20 - 1.00 mg/dL Final    Comment: Use of this assay is not recommended for patients undergoing  treatment with eltrombopag due to the potential for falsely elevated results.  N-acetyl-p-benzoquinone imine (metabolite of Acetaminophen) will generate erroneously low results in samples for patients that have taken an overdose of Acetaminophen.    eGFR 41  ml/min/1.73sq m Final    Narrative:     National Kidney Disease Foundation guidelines for Chronic Kidney Disease (CKD):     Stage 1 with normal or high GFR (GFR > 90 mL/min/1.73 square meters)    Stage 2 Mild CKD (GFR = 60-89 mL/min/1.73 square meters)    Stage 3A Moderate CKD (GFR = 45-59 mL/min/1.73 square meters)    Stage 3B Moderate CKD (GFR = 30-44 mL/min/1.73 square meters)    Stage 4 Severe CKD (GFR = 15-29 mL/min/1.73 square meters)    Stage 5 End Stage CKD (GFR <15 mL/min/1.73 square meters)  Note: GFR calculation is accurate only with a steady state creatinine   CK - Normal    Total   39 - 308 U/L Final    XR shoulder 2+ views LEFT   ED Interpretation   No acute osseous abnormality      Final Result      No acute osseous abnormality.      High riding humeral head suggesting chronic high-grade rotator cuff tear.      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         Computerized Assisted Algorithm (CAA) may have been used to analyze all applicable images.         Resident: Alfonzo Jc I, the attending radiologist, have reviewed the images and agree with the final report above.      Workstation performed: [id number]         CT head without contrast   Final Result      No acute intracranial abnormality.                  Workstation performed: [id number]         CT spine cervical without contrast   Final Result      No cervical spine fracture or traumatic malalignment.                  Workstation performed: [id number]            Medications: Code Status:   Medications - No data to display Code Status: Prior  Advance Directive and Living Will:      Power of :    POLST:       Orders Placed This Encounter    Procedures    CT head without contrast    CT spine cervical without contrast    XR shoulder 2+ views LEFT    CBC and differential    CK    Comprehensive metabolic panel     Time reflects when diagnosis was documented in both MDM as applicable and the Disposition within this note       Time User Action Codes Description Comment    12/19/2024  6:28 AM Luca Duncan [W19.XXXA] Fall, initial encounter           ED Disposition       ED Disposition   Discharge    Condition   Stable    Date/Time   u Dec 19, 2024  6:28 AM    Comment   Usman S Jenelle discharge to home/self care.                   Follow-up Information    None       Discharge Medication List as of 12/19/2024  6:29 AM        CONTINUE these medications which have NOT CHANGED    Details   acetaminophen (TYLENOL) 325 mg tablet Take 3 tablets (975 mg total) by mouth every 8 (eight) hours as needed for mild pain, Starting Fri 8/12/2022, No Print      atorvastatin (LIPITOR) 10 mg tablet Take 10 mg by mouth daily, Historical Med      meclizine (ANTIVERT) 25 mg tablet Take 1 tablet (25 mg total) by mouth every 8 (eight) hours as needed for dizziness for up to 30 doses, Starting Sat 7/8/2023, Normal      sertraline (ZOLOFT) 25 mg tablet Take 25 mg by mouth 2 (two) times a day before lunch and dinner, Historical Med           No discharge procedures on file.  Prior to Admission Medications   Prescriptions Last Dose Informant Patient Reported? Taking?   acetaminophen (TYLENOL) 325 mg tablet   No No   Sig: Take 3 tablets (975 mg total) by mouth every 8 (eight) hours as needed for mild pain   atorvastatin (LIPITOR) 10 mg tablet   Yes No   Sig: Take 10 mg by mouth daily   meclizine (ANTIVERT) 25 mg tablet   No No   Sig: Take 1 tablet (25 mg total) by mouth every 8 (eight) hours as needed for dizziness for up to 30 doses   sertraline (ZOLOFT) 25 mg tablet  Self Yes No   Sig: Take 25 mg by mouth 2 (two) times a day before lunch and dinner      Facility-Administered  "Medications: None                        Portions of the record may have been created with voice recognition software. Occasional wrong word or \"sound a like\" substitutions may have occurred due to the inherent limitations of voice recognition software. Read the chart carefully and recognize, using context, where substitutions have occurred.    Electronically signed by:  Harlan Whitney  "

## 2024-12-19 NOTE — ED PROVIDER NOTES
Time reflects when diagnosis was documented in both MDM as applicable and the Disposition within this note       Time User Action Codes Description Comment    12/19/2024  6:28 AM Luca Duncan Add [W19.XXXA] Fall, initial encounter           ED Disposition       ED Disposition   Discharge    Condition   Stable    Date/Time   Thu Dec 19, 2024  6:28 AM    Comment   Usman S Jenelle discharge to home/self care.                   Assessment & Plan       Medical Decision Making  Will obtain labs given unknown downtime as well as a CT head and neck for the fall.  Will x-ray the left shoulder as well.    CT head and cervical spine are negative.  Patient is back to baseline.  At this time will discharge back to facility.  Facility instructions given.    Amount and/or Complexity of Data Reviewed  Labs: ordered. Decision-making details documented in ED Course.  Radiology: ordered and independent interpretation performed. Decision-making details documented in ED Course.        ED Course as of 12/21/24 1940   Thu Dec 19, 2024   0608 Creatinine(!): 1.57  Baseline   0608 Hemoglobin(!): 9.5  Slightly below baseline at 10.5   0608 CT spine cervical without contrast  Negative   0610 CT head without contrast  Negative       Medications - No data to display    ED Risk Strat Scores                  Identification of Seniors at Risk      Flowsheet Row Most Recent Value   (ISAR) Identification of Seniors at Risk    Before the illness or injury that brought you to the Emergency, did you need someone to help you on a regular basis? 1 Filed at: 12/19/2024 0503   In the last 24 hours, have you needed more help than usual? 1 Filed at: 12/19/2024 0503   Have you been hospitalized for one or more nights during the past 6 months? 1 Filed at: 12/19/2024 0503   In general, do you see well? 1 Filed at: 12/19/2024 0503   In general, do you have serious problems with your memory? 1 Filed at: 12/19/2024 0503   Do you take more than three different  medications every day? 1 Filed at: 2024 0503   ISAR Score 6 Filed at: 2024 0503                SBIRT 22yo+      Flowsheet Row Most Recent Value   Initial Alcohol Screen: US AUDIT-C     1. How often do you have a drink containing alcohol? 0 Filed at: 2024 0503   2. How many drinks containing alcohol do you have on a typical day you are drinking?  0 Filed at: 2024 0503   3b. FEMALE Any Age, or MALE 65+: How often do you have 4 or more drinks on one occassion? 0 Filed at: 2024 0503   Audit-C Score 0 Filed at: 2024 0503   WILFREDO: How many times in the past year have you...    Used an illegal drug or used a prescription medication for non-medical reasons? Never Filed at: 2024 0503                            History of Present Illness       Chief Complaint   Patient presents with    Fall     Fall,u/x downtime.left shoulder pain. No signs of head trauma,denies head or neck pain.        Past Medical History:   Diagnosis Date    Anemia     Anxiety     Bilateral inguinal hernia 2022    Evaluated @ [facility]    Bronchitis     Chronic kidney disease     ckd 3    Cognitive communication disorder     Colitis     Hyperlipidemia     Pre-diabetes     Trauma     mechanical fall down stairs      Past Surgical History:   Procedure Laterality Date    CATARACT EXTRACTION      COLONOSCOPY      COLONOSCOPY W/ BIOPSIES  2001    Tabitha Thomas MD    MOHS RECONSTRUCTION      basal ca    AL RPR 1ST INGUN HRNA AGE 7 YRS/> REDUCIBLE Bilateral 8/10/2022    Procedure: REPAIR HERNIA INGUINAL;  Surgeon: Rajat Silva MD;  Location: [facility];  Service: General      History reviewed. No pertinent family history.   Social History     Tobacco Use    Smoking status: Former     Current packs/day: 0.00     Types: Cigarettes     Start date:      Quit date:      Years since quittin.9    Smokeless tobacco: Never   Vaping Use    Vaping status: Never Used   Substance Use Topics     Alcohol use: Yes     Comment: rare    Drug use: Not Currently      E-Cigarette/Vaping    E-Cigarette Use Never User       E-Cigarette/Vaping Substances    Nicotine No     THC No     CBD No     Flavoring No     Other No     Unknown No       I have reviewed and agree with the history as documented.     79-year-old past medical history of dementia, hyperlipidemia, CKD presenting to the emergency department after unwitnessed fall complaining right hip pain to staff. He is not complaining anything to EMS. Secured dementia unit found down. Complaining is left shoulder discomfort.  Patient unable to give any relevant history.  GCS 14 which is baseline for him.            Review of Systems        Objective       ED Triage Vitals   Temperature Pulse Blood Pressure Respirations SpO2 Patient Position - Orthostatic VS   12/19/24 0601 12/19/24 0502 12/19/24 0502 12/19/24 0502 12/19/24 0502 --   98.9 °F (37.2 °C) 62 166/77 18 98 %       Temp Source Heart Rate Source BP Location FiO2 (%) Pain Score    12/19/24 0601 12/19/24 0502 12/19/24 0502 -- --    Oral Monitor Left arm        Vitals      Date and Time Temp Pulse SpO2 Resp BP Pain Score FACES Pain Rating User   12/19/24 0601 98.9 °F (37.2 °C) -- -- -- -- -- -- RG   12/19/24 0502 -- 62 98 % 18 166/77 -- -- AM            Physical Exam  Vitals and nursing note reviewed.   Constitutional:       Appearance: Normal appearance. He is well-developed.   HENT:      Head: Normocephalic and atraumatic.      Nose: Nose normal.      Mouth/Throat:      Mouth: Mucous membranes are moist.      Pharynx: No oropharyngeal exudate or posterior oropharyngeal erythema.   Eyes:      Extraocular Movements: Extraocular movements intact.      Conjunctiva/sclera: Conjunctivae normal.      Pupils: Pupils are equal, round, and reactive to light.   Cardiovascular:      Rate and Rhythm: Normal rate and regular rhythm.      Pulses: Normal pulses.      Heart sounds: Normal heart sounds.   Pulmonary:       Effort: Pulmonary effort is normal.      Breath sounds: Normal breath sounds.   Abdominal:      General: Bowel sounds are normal. There is no distension.      Palpations: Abdomen is soft.      Tenderness: There is no abdominal tenderness. There is no guarding or rebound.   Musculoskeletal:         General: Normal range of motion.      Cervical back: Normal range of motion and neck supple.      Right lower leg: No edema.      Left lower leg: No edema.      Comments: Tenderness to left shoulder   Skin:     General: Skin is warm and dry.      Capillary Refill: Capillary refill takes less than 2 seconds.   Neurological:      General: No focal deficit present.      Mental Status: He is alert and oriented to person, place, and time.      Cranial Nerves: No cranial nerve deficit.   Psychiatric:         Mood and Affect: Mood normal.         Behavior: Behavior normal.         Results Reviewed       Procedure Component Value Units Date/Time    CK [[id number]]  (Normal) Collected: 12/19/24 0520    Lab Status: Final result Specimen: Blood from Arm, Left Updated: 12/19/24 0603     Total  U/L     Comprehensive metabolic panel [[id number]]  (Abnormal) Collected: 12/19/24 0520    Lab Status: Final result Specimen: Blood from Arm, Left Updated: 12/19/24 0603     Sodium 142 mmol/L      Potassium 4.3 mmol/L      Chloride 110 mmol/L      CO2 25 mmol/L      ANION GAP 7 mmol/L      BUN 27 mg/dL      Creatinine 1.57 mg/dL      Glucose 94 mg/dL      Calcium 8.9 mg/dL      AST 18 U/L      ALT 12 U/L      Alkaline Phosphatase 69 U/L      Total Protein 6.2 g/dL      Albumin 3.8 g/dL      Total Bilirubin 0.38 mg/dL      eGFR 41 ml/min/1.73sq m     Narrative:      National Kidney Disease Foundation guidelines for Chronic Kidney Disease (CKD):     Stage 1 with normal or high GFR (GFR > 90 mL/min/1.73 square meters)    Stage 2 Mild CKD (GFR = 60-89 mL/min/1.73 square meters)    Stage 3A Moderate CKD (GFR = 45-59 mL/min/1.73 square meters)     Stage 3B Moderate CKD (GFR = 30-44 mL/min/1.73 square meters)    Stage 4 Severe CKD (GFR = 15-29 mL/min/1.73 square meters)    Stage 5 End Stage CKD (GFR <15 mL/min/1.73 square meters)  Note: GFR calculation is accurate only with a steady state creatinine    CBC and differential [[id number]]  (Abnormal) Collected: 12/19/24 0520    Lab Status: Final result Specimen: Blood from Arm, Left Updated: 12/19/24 0533     WBC 7.68 Thousand/uL      RBC 3.01 Million/uL      Hemoglobin 9.5 g/dL      Hematocrit 30.1 %       fL      MCH 31.6 pg      MCHC 31.6 g/dL      RDW 12.7 %      MPV 10.4 fL      Platelets 131 Thousands/uL      nRBC 0 /100 WBCs      Segmented % 70 %      Immature Grans % 0 %      Lymphocytes % 19 %      Monocytes % 9 %      Eosinophils Relative 2 %      Basophils Relative 0 %      Absolute Neutrophils 5.31 Thousands/µL      Absolute Immature Grans 0.03 Thousand/uL      Absolute Lymphocytes 1.46 Thousands/µL      Absolute Monocytes 0.72 Thousand/µL      Eosinophils Absolute 0.13 Thousand/µL      Basophils Absolute 0.03 Thousands/µL             XR shoulder 2+ views LEFT   ED Interpretation by Luca Duncan MD (12/19 0610)   No acute osseous abnormality      Final Interpretation by Harshad Arellano MD (12/19 1020)      No acute osseous abnormality.      High riding humeral head suggesting chronic high-grade rotator cuff tear.      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         Computerized Assisted Algorithm (CAA) may have been used to analyze all applicable images.         Resident: Alfonzo Jc I, the attending radiologist, have reviewed the images and agree with the final report above.      Workstation performed: [id number]         CT head without contrast   Final Interpretation by Arielle Aj MD (12/19 0606)      No acute intracranial abnormality.                  Workstation performed: [id number]         CT spine cervical without contrast   Final  Interpretation by Arielle Aj MD (12/19 0606)      No cervical spine fracture or traumatic malalignment.                  Workstation performed: [id number]             Procedures    ED Medication and Procedure Management   Prior to Admission Medications   Prescriptions Last Dose Informant Patient Reported? Taking?   acetaminophen (TYLENOL) 325 mg tablet   No No   Sig: Take 3 tablets (975 mg total) by mouth every 8 (eight) hours as needed for mild pain   atorvastatin (LIPITOR) 10 mg tablet   Yes No   Sig: Take 10 mg by mouth daily   meclizine (ANTIVERT) 25 mg tablet   No No   Sig: Take 1 tablet (25 mg total) by mouth every 8 (eight) hours as needed for dizziness for up to 30 doses   sertraline (ZOLOFT) 25 mg tablet  Self Yes No   Sig: Take 25 mg by mouth 2 (two) times a day before lunch and dinner      Facility-Administered Medications: None     Discharge Medication List as of 12/19/2024  6:29 AM        CONTINUE these medications which have NOT CHANGED    Details   acetaminophen (TYLENOL) 325 mg tablet Take 3 tablets (975 mg total) by mouth every 8 (eight) hours as needed for mild pain, Starting Fri 8/12/2022, No Print      atorvastatin (LIPITOR) 10 mg tablet Take 10 mg by mouth daily, Historical Med      meclizine (ANTIVERT) 25 mg tablet Take 1 tablet (25 mg total) by mouth every 8 (eight) hours as needed for dizziness for up to 30 doses, Starting Sat 7/8/2023, Normal      sertraline (ZOLOFT) 25 mg tablet Take 25 mg by mouth 2 (two) times a day before lunch and dinner, Historical Med           No discharge procedures on file.  ED SEPSIS DOCUMENTATION   Time reflects when diagnosis was documented in both MDM as applicable and the Disposition within this note       Time User Action Codes Description Comment    12/19/2024  6:28 AM Luca Duncan Add [W19.XXXA] Fall, initial encounter                  Luca Duncan MD  12/21/24 1940

## 2024-12-19 NOTE — DISCHARGE INSTRUCTIONS
Is imperative that this patient is observed and watched whenever he is attempting to ambulate as he is a high fall risk with multiple falls in the past.  He is at high risk of having severe injury secondary to a fall.

## (undated) DEVICE — BETHLEHEM UNIVERSAL MINOR GEN: Brand: CARDINAL HEALTH

## (undated) DEVICE — ADHESIVE SKIN HIGH VISCOSITY EXOFIN 1ML

## (undated) DEVICE — PROXIMATE PLUS MD MULTI-DIRECTIONAL RELEASE SKIN STAPLERS CONTAINS 35 STAINLESS STEEL STAPLES APPROXIMATE CLOSED DIMENSIONS: 6.9MM X 3.9MM WIDE: Brand: PROXIMATE

## (undated) DEVICE — PENROSE DRAIN, 18 X 3 8: Brand: CARDINAL HEALTH

## (undated) DEVICE — NEEDLE 25G X 1 1/2

## (undated) DEVICE — CHLORAPREP HI-LITE 26ML ORANGE

## (undated) DEVICE — ANTIBACTERIAL UNDYED BRAIDED (POLYGLACTIN 910), SYNTHETIC ABSORBABLE SUTURE: Brand: COATED VICRYL

## (undated) DEVICE — GAUZE SPONGES,16 PLY: Brand: CURITY

## (undated) DEVICE — GLOVE SRG BIOGEL ORTHOPEDIC 7.5

## (undated) DEVICE — GAUZE SPONGES,USP TYPE VII GAUZE, 12 PLY: Brand: CURITY

## (undated) DEVICE — SUT VICRYL 3-0 SH 27 IN J416H

## (undated) DEVICE — SUT PROLENE 2-0 CT-2 30 IN 8411H

## (undated) DEVICE — SPONGE CHERRY 1/2IN

## (undated) DEVICE — INTENDED FOR TISSUE SEPARATION, AND OTHER PROCEDURES THAT REQUIRE A SHARP SURGICAL BLADE TO PUNCTURE OR CUT.: Brand: BARD-PARKER SAFETY BLADES SIZE 15, STERILE

## (undated) DEVICE — PLUMEPEN PRO 10FT